# Patient Record
Sex: FEMALE | Race: BLACK OR AFRICAN AMERICAN | Employment: OTHER | URBAN - METROPOLITAN AREA
[De-identification: names, ages, dates, MRNs, and addresses within clinical notes are randomized per-mention and may not be internally consistent; named-entity substitution may affect disease eponyms.]

---

## 2017-01-01 RX ORDER — PRAVASTATIN SODIUM 20 MG/1
TABLET ORAL
Qty: 30 TAB | Refills: 11 | Status: SHIPPED | OUTPATIENT
Start: 2017-01-01

## 2017-01-18 ENCOUNTER — OFFICE VISIT (OUTPATIENT)
Dept: INTERNAL MEDICINE CLINIC | Age: 82
End: 2017-01-18

## 2017-01-18 ENCOUNTER — HOSPITAL ENCOUNTER (OUTPATIENT)
Dept: LAB | Age: 82
Discharge: HOME | End: 2017-01-18
Payer: MEDICARE

## 2017-01-18 VITALS
WEIGHT: 111 LBS | HEIGHT: 62 IN | TEMPERATURE: 98.1 F | OXYGEN SATURATION: 95 % | SYSTOLIC BLOOD PRESSURE: 136 MMHG | BODY MASS INDEX: 20.43 KG/M2 | HEART RATE: 72 BPM | DIASTOLIC BLOOD PRESSURE: 74 MMHG | RESPIRATION RATE: 16 BRPM

## 2017-01-18 DIAGNOSIS — E78.5 HYPERLIPIDEMIA, UNSPECIFIED HYPERLIPIDEMIA TYPE: ICD-10-CM

## 2017-01-18 DIAGNOSIS — F03.90 DEMENTIA WITHOUT BEHAVIORAL DISTURBANCE, UNSPECIFIED DEMENTIA TYPE: ICD-10-CM

## 2017-01-18 DIAGNOSIS — E11.9 CONTROLLED TYPE 2 DIABETES MELLITUS WITHOUT COMPLICATION, WITHOUT LONG-TERM CURRENT USE OF INSULIN (HCC): ICD-10-CM

## 2017-01-18 DIAGNOSIS — I10 ESSENTIAL HYPERTENSION: Primary | ICD-10-CM

## 2017-01-18 DIAGNOSIS — Z23 ENCOUNTER FOR IMMUNIZATION: ICD-10-CM

## 2017-01-18 DIAGNOSIS — B35.3 TINEA PEDIS OF BOTH FEET: ICD-10-CM

## 2017-01-18 PROCEDURE — 80053 COMPREHEN METABOLIC PANEL: CPT

## 2017-01-18 PROCEDURE — 36415 COLL VENOUS BLD VENIPUNCTURE: CPT

## 2017-01-18 PROCEDURE — 83036 HEMOGLOBIN GLYCOSYLATED A1C: CPT

## 2017-01-18 PROCEDURE — 80061 LIPID PANEL: CPT

## 2017-01-18 NOTE — MR AVS SNAPSHOT
Visit Information Date & Time Provider Department Dept. Phone Encounter #  
 1/18/2017  1:00 PM Lisa West MD [facility] [phone] [id number] Follow-up Instructions Return in about 6 months (around 7/18/2017) for bp, weight. Upcoming Health Maintenance Date Due  
 EYE EXAM RETINAL OR DILATED Q1 8/2/1932 DTaP/Tdap/Td series (1 - Tdap) 8/2/1943 GLAUCOMA SCREENING Q2Y 8/2/1987 Pneumococcal 65+ High/Highest Risk (2 of 2 - PPSV23) 5/4/2016 INFLUENZA AGE 9 TO ADULT 8/1/2016 HEMOGLOBIN A1C Q6M 9/9/2016 MICROALBUMIN Q1 3/9/2017 LIPID PANEL Q1 3/9/2017 MEDICARE YEARLY EXAM 3/10/2017 FOOT EXAM Q1 1/18/2018 Allergies as of 1/18/2017  Review Complete On: 1/18/2017 By: Lisa West MD  
 No Known Allergies Current Immunizations  Reviewed on 1/18/2017 Name Date Influenza High Dose Vaccine PF  Incomplete Influenza Vaccine Carmen Juwan) 9/9/2015 Influenza Vaccine Split 10/31/2012 10:17 AM  
 Pneumococcal Conjugate (PCV-13) 3/9/2016 Pneumococcal Vaccine (Unspecified Type)  Deferred (Patient Refused) Reviewed by Constanza Richmond LPN on 2/75/0012 at 94:01 PM  
 Reviewed by Lisa West MD on 1/18/2017 at  1:18 PM  
You Were Diagnosed With   
  
 Codes Comments Essential hypertension    -  Primary ICD-10-CM: I10 
ICD-9-CM: 401.9 Controlled type 2 diabetes mellitus without complication, without long-term current use of insulin ([facility]     ICD-10-CM: E11.9 ICD-9-CM: 250.00 Hyperlipidemia, unspecified hyperlipidemia type     ICD-10-CM: E78.5 ICD-9-CM: 272.4 Dementia without behavioral disturbance, unspecified dementia type     ICD-10-CM: F03.90 ICD-9-CM: 294.20 Encounter for immunization     ICD-10-CM: U27 ICD-9-CM: V03.89 Vitals BP Pulse Temp Resp Height(growth percentile) Weight(growth percentile)  136/74 (BP 1 Location: Right arm, BP Patient Position: Sitting) 72 98.1 °F (36.7 °C) (Oral) 16 5' 2\" (1.575 m) 111 lb (50.3 kg) SpO2 BMI OB Status Smoking Status 95% 20.3 kg/m2 Postmenopausal Never Smoker Vitals History BMI and BSA Data Body Mass Index Body Surface Area  
 20.3 kg/m 2 1.48 m 2 Preferred Pharmacy Pharmacy Name Phone [id number], [address] [phone] Your Updated Medication List  
  
   
This list is accurate as of: 1/18/17  1:32 PM.  Always use your most recent med list. amLODIPine 10 mg tablet Commonly known as:  Tad Zora Take 1 Tab by mouth daily. glucose blood VI test strips strip Commonly known as:  ONETOUCH ULTRA TEST Test blood sugar in morning and at bedtime  
  
 losartan 25 mg tablet Commonly known as:  COZAAR  
take 1 tablet by mouth once daily ONETOUCH ULTRASOFT LANCETS Misc Generic drug:  Lancets TEST every morning and at bedtime  
  
 pravastatin 20 mg tablet Commonly known as:  PRAVACHOL  
take 1 tablet by mouth once daily We Performed the Following ADMIN INFLUENZA VIRUS VAC [ [facility]] HEMOGLOBIN A1C WITH EAG [37812 CPT(R)]  DIABETES FOOT EXAM [7 Custom] INFLUENZA VIRUS VACCINE, HIGH DOSE SEASONAL, PRESERVATIVE FREE [01734 CPT(R)] LIPID PANEL [14834 CPT(R)] METABOLIC PANEL, COMPREHENSIVE [74789 CPT(R)] Follow-up Instructions Return in about 6 months (around 7/18/2017) for bp, weight. Please provide this summary of care documentation to your next provider. Your primary care clinician is listed as Andreina Figueroa. If you have any questions after today's visit, please call [phone].

## 2017-01-18 NOTE — PROGRESS NOTES
Patient received paperwork for advance directive during previous visit but has not completed at this time     Reviewed record In preparation for visit and have obtained necessary documentation      1. Have you been to the ER, urgent care clinic since your last visit? Hospitalized since your last visit? NO    2. Have you seen or consulted any other health care providers outside of the [address] [place] [address] since your last visit? Include any pap smears or colon screening.  NO

## 2017-01-18 NOTE — PROGRESS NOTES
HPI  Ms. Sarah Oropeza is a 80y.o. year old female, she is seen today for follow up HTN, weight. Complains of pain between toes of left foot. Eating well per patient (okay per daughter). No cp, sob, dizziness or weakness. No palpitations. No edema. No n/v/abd pain. [place] couple weeks ago in kitchen, walking and lost balance, no injury. Didn't have cane at the time but mostly uses it. Chief Complaint   Patient presents with    Blood Pressure Check     rm 1//NONfasting    Weight Management        Prior to Admission medications    Medication Sig Start Date End Date Taking? Authorizing Provider   nystatin (MYCOSTATIN) powder Apply  to affected area four (4) times daily. 1/20/17  Yes Trell Cueto MD   pravastatin (PRAVACHOL) 20 mg tablet take 1 tablet by mouth once daily 11/27/16  Yes Trell Cueto MD   losartan (COZAAR) 25 mg tablet take 1 tablet by mouth once daily 4/3/16  Yes Trell Cueto MD   amLODIPine (NORVASC) 10 mg tablet Take 1 Tab by mouth daily. 3/9/16  Yes Trell Cueto MD   glucose blood VI test strips (ONE TOUCH ULTRA TEST) strip Test blood sugar in morning and at bedtime 11/11/14   Trell Cueto MD   ONE TOUCH ULTRASOFT LANCETS misc TEST every morning and at bedtime 7/15/14   Trell Cueto MD         No Known Allergies      REVIEW OF SYSTEMS:  Per HPI    PHYSICAL EXAM:  Visit Vitals    /74 (BP 1 Location: Right arm, BP Patient Position: Sitting)    Pulse 72    Temp 98.1 °F (36.7 °C) (Oral)    Resp 16    Ht 5' 2\" (1.575 m)    Wt 111 lb (50.3 kg)    SpO2 95%    BMI 20.3 kg/m2     Constitutional: Appears well-developed and well-nourished. No distress. HENT:   Head: Normocephalic and atraumatic. Eyes: No scleral icterus. Neck: no lad, no tm, supple   Cardiovascular: Normal S1/S2, regular rhythm. 3/6 systolic murmur without rubs, or gallops. Pulmonary/Chest: Effort normal and breath sounds normal. No respiratory distress.  No wheezes, rhonchi, or rales. Abdomen: Soft, NT/ND, +BS, no rebound or guarding, no masses, no HSM appreciated. Ext: No edema. Neurological: Alert. Psychiatric: Normal mood and affect. Behavior is normal.    DM foot exam: trace pedal pulses, +macerated skin between all toes both feet, sensation intact to monofilament right foot, absent left foot , +onychomycosis of toenails      Lab Results   Component Value Date/Time    Sodium 141 01/18/2017 01:49 PM    Potassium 4.6 01/18/2017 01:49 PM    Chloride 101 01/18/2017 01:49 PM    CO2 24 01/18/2017 01:49 PM    Anion gap 9 09/10/2010 11:35 AM    Glucose 128 01/18/2017 01:49 PM    BUN 16 01/18/2017 01:49 PM    Creatinine 1.08 01/18/2017 01:49 PM    BUN/Creatinine ratio 15 01/18/2017 01:49 PM    GFR est AA 51 01/18/2017 01:49 PM    GFR est non-AA 44 01/18/2017 01:49 PM    Calcium 9.7 01/18/2017 01:49 PM    Bilirubin, total 0.8 01/18/2017 01:49 PM    ALT 10 01/18/2017 01:49 PM    AST 21 01/18/2017 01:49 PM    Alk. phosphatase 98 01/18/2017 01:49 PM    Protein, total 7.6 01/18/2017 01:49 PM    Albumin 4.2 01/18/2017 01:49 PM    Globulin 4.0 09/08/2010 11:13 AM    A-G Ratio 1.2 01/18/2017 01:49 PM     Lab Results   Component Value Date/Time    Hemoglobin A1c 5.8 01/18/2017 01:49 PM    Hemoglobin A1c 5.7 03/09/2016 03:38 PM    Hemoglobin A1c 6.3 09/02/2014 10:45 AM      Lab Results   Component Value Date/Time    Cholesterol, total 184 01/18/2017 01:49 PM    HDL Cholesterol 33 01/18/2017 01:49 PM    LDL, calculated 107 01/18/2017 01:49 PM    VLDL, calculated 44 01/18/2017 01:49 PM    Triglyceride 219 01/18/2017 01:49 PM    CHOL/HDL Ratio 7.0 06/24/2009 11:50 AM          ASSESSMENT/PLAN  Gladis Coffey was seen today for blood pressure check and weight management.     Diagnoses and all orders for this visit:    Essential hypertension  -     METABOLIC PANEL, COMPREHENSIVE  At goal, continue current medications   Controlled type 2 diabetes mellitus without complication, without long-term current use of insulin (HCC)  -      DIABETES FOOT EXAM  -     HEMOGLOBIN A1C WITH EAG  Diet controlled  Hyperlipidemia, unspecified hyperlipidemia type  -     LIPID PANEL    Dementia without behavioral disturbance, unspecified dementia type  stable  Encounter for immunization  -     Influenza virus vaccine ([address]) 72 years and older  -     Administration fee () for Medicare insured patients    Tinea pedis of both feet  -     nystatin (MYCOSTATIN) powder; Apply  to affected area four (4) times daily. advised appt with podiatry for nail trimming  Other orders  -     CVD REPORT  -     CKD REPORT          Health Maintenance Due   Topic Date Due    EYE EXAM RETINAL OR DILATED Q1  08/02/1932    GLAUCOMA SCREENING Q2Y  08/02/1987    Pneumococcal 65+ High/Highest Risk (2 of 2 - PPSV23) 05/04/2016        Follow-up Disposition:  Return in about 6 months (around 7/18/2017) for bp, weight. Reviewed plan of care. Patient has provided input and agrees with goals. The nurse provided the patient and/or family with advanced directive information if needed and encouraged the patient to provide a copy to the office when available.

## 2017-01-19 LAB
ALBUMIN SERPL-MCNC: 4.2 G/DL (ref 3.2–4.6)
ALBUMIN/GLOB SERPL: 1.2 {RATIO} (ref 1.1–2.5)
ALP SERPL-CCNC: 98 IU/L (ref 39–117)
ALT SERPL-CCNC: 10 IU/L (ref 0–32)
AST SERPL-CCNC: 21 IU/L (ref 0–40)
BILIRUB SERPL-MCNC: 0.8 MG/DL (ref 0–1.2)
BUN SERPL-MCNC: 16 MG/DL (ref 10–36)
BUN/CREAT SERPL: 15 (ref 11–26)
CALCIUM SERPL-MCNC: 9.7 MG/DL (ref 8.7–10.3)
CHLORIDE SERPL-SCNC: 101 MMOL/L (ref 96–106)
CHOLEST SERPL-MCNC: 184 MG/DL (ref 100–199)
CO2 SERPL-SCNC: 24 MMOL/L (ref 18–29)
CREAT SERPL-MCNC: 1.08 MG/DL (ref 0.57–1)
EST. AVERAGE GLUCOSE BLD GHB EST-MCNC: 120 MG/DL
GLOBULIN SER CALC-MCNC: 3.4 G/DL (ref 1.5–4.5)
GLUCOSE SERPL-MCNC: 128 MG/DL (ref 65–99)
HBA1C MFR BLD: 5.8 % (ref 4.8–5.6)
HDLC SERPL-MCNC: 33 MG/DL
INTERPRETATION, 910389: NORMAL
INTERPRETATION: NORMAL
LDLC SERPL CALC-MCNC: 107 MG/DL (ref 0–99)
POTASSIUM SERPL-SCNC: 4.6 MMOL/L (ref 3.5–5.2)
PROT SERPL-MCNC: 7.6 G/DL (ref 6–8.5)
SODIUM SERPL-SCNC: 141 MMOL/L (ref 134–144)
TRIGL SERPL-MCNC: 219 MG/DL (ref 0–149)
VLDLC SERPL CALC-MCNC: 44 MG/DL (ref 5–40)

## 2017-01-20 RX ORDER — NYSTATIN 100000 [USP'U]/G
POWDER TOPICAL 4 TIMES DAILY
Qty: 1 BOTTLE | Refills: 3 | Status: SHIPPED | OUTPATIENT
Start: 2017-01-20 | End: 2017-06-30

## 2017-03-12 DIAGNOSIS — I10 ESSENTIAL HYPERTENSION: ICD-10-CM

## 2017-03-12 RX ORDER — AMLODIPINE BESYLATE 10 MG/1
TABLET ORAL
Qty: 30 TAB | Refills: 11 | Status: SHIPPED | OUTPATIENT
Start: 2017-03-12 | End: 2018-01-01 | Stop reason: SDUPTHER

## 2017-03-21 RX ORDER — LOSARTAN POTASSIUM 25 MG/1
TABLET ORAL
Qty: 30 TAB | Refills: 11 | Status: SHIPPED | OUTPATIENT
Start: 2017-03-21 | End: 2017-06-30

## 2017-03-27 RX ORDER — LOSARTAN POTASSIUM 25 MG/1
TABLET ORAL
Qty: 30 TAB | Refills: 11 | Status: ON HOLD | OUTPATIENT
Start: 2017-03-27 | End: 2017-07-03

## 2017-06-30 ENCOUNTER — HOSPITAL ENCOUNTER (OUTPATIENT)
Age: 82
Setting detail: OBSERVATION
Discharge: HOME | End: 2017-07-03
Attending: EMERGENCY MEDICINE | Admitting: INTERNAL MEDICINE
Payer: MEDICARE

## 2017-06-30 ENCOUNTER — TELEPHONE (OUTPATIENT)
Dept: INTERNAL MEDICINE CLINIC | Age: 82
End: 2017-06-30

## 2017-06-30 DIAGNOSIS — K92.2 GASTROINTESTINAL HEMORRHAGE, UNSPECIFIED GASTROINTESTINAL HEMORRHAGE TYPE: Primary | ICD-10-CM

## 2017-06-30 PROBLEM — K62.5 RECTAL BLEEDING: Status: ACTIVE | Noted: 2017-06-30

## 2017-06-30 PROBLEM — K62.5 RECTAL BLEED: Status: ACTIVE | Noted: 2017-06-30

## 2017-06-30 LAB
ABO + RH BLD: NORMAL
ALBUMIN SERPL BCP-MCNC: 3.4 G/DL (ref 3.5–5)
ALBUMIN/GLOB SERPL: 0.7 {RATIO} (ref 1.1–2.2)
ALP SERPL-CCNC: 101 U/L (ref 45–117)
ALT SERPL-CCNC: 19 U/L (ref 12–78)
ANION GAP BLD CALC-SCNC: 6 MMOL/L (ref 5–15)
APPEARANCE UR: ABNORMAL
APTT PPP: 25.7 SEC (ref 22.1–32.5)
AST SERPL W P-5'-P-CCNC: 17 U/L (ref 15–37)
ATRIAL RATE: 48 BPM
BACTERIA URNS QL MICRO: ABNORMAL /HPF
BASOPHILS # BLD AUTO: 0 K/UL (ref 0–0.1)
BASOPHILS # BLD: 1 % (ref 0–1)
BILIRUB SERPL-MCNC: 0.8 MG/DL (ref 0.2–1)
BILIRUB UR QL: NEGATIVE
BLOOD GROUP ANTIBODIES SERPL: NORMAL
BUN SERPL-MCNC: 19 MG/DL (ref 6–20)
BUN/CREAT SERPL: 18 (ref 12–20)
CALCIUM SERPL-MCNC: 9 MG/DL (ref 8.5–10.1)
CALCULATED P AXIS, ECG09: 36 DEGREES
CALCULATED R AXIS, ECG10: 9 DEGREES
CALCULATED T AXIS, ECG11: -63 DEGREES
CHLORIDE SERPL-SCNC: 108 MMOL/L (ref 97–108)
CO2 SERPL-SCNC: 24 MMOL/L (ref 21–32)
COLOR UR: ABNORMAL
CREAT SERPL-MCNC: 1.04 MG/DL (ref 0.55–1.02)
DIAGNOSIS, 93000: NORMAL
EOSINOPHIL # BLD: 0.2 K/UL (ref 0–0.4)
EOSINOPHIL NFR BLD: 3 % (ref 0–7)
EPITH CASTS URNS QL MICRO: ABNORMAL /LPF
ERYTHROCYTE [DISTWIDTH] IN BLOOD BY AUTOMATED COUNT: 14.7 % (ref 11.5–14.5)
GLOBULIN SER CALC-MCNC: 4.7 G/DL (ref 2–4)
GLUCOSE SERPL-MCNC: 87 MG/DL (ref 65–100)
GLUCOSE UR STRIP.AUTO-MCNC: NEGATIVE MG/DL
HCT VFR BLD AUTO: 38.1 % (ref 35–47)
HGB BLD-MCNC: 11.9 G/DL (ref 11.5–16)
HGB UR QL STRIP: ABNORMAL
INR PPP: 1 (ref 0.9–1.1)
KETONES UR QL STRIP.AUTO: NEGATIVE MG/DL
LEUKOCYTE ESTERASE UR QL STRIP.AUTO: ABNORMAL
LYMPHOCYTES # BLD AUTO: 34 % (ref 12–49)
LYMPHOCYTES # BLD: 2.2 K/UL (ref 0.8–3.5)
MCH RBC QN AUTO: 27 PG (ref 26–34)
MCHC RBC AUTO-ENTMCNC: 31.2 G/DL (ref 30–36.5)
MCV RBC AUTO: 86.6 FL (ref 80–99)
MONOCYTES # BLD: 0.3 K/UL (ref 0–1)
MONOCYTES NFR BLD AUTO: 5 % (ref 5–13)
NEUTS SEG # BLD: 3.7 K/UL (ref 1.8–8)
NEUTS SEG NFR BLD AUTO: 57 % (ref 32–75)
NITRITE UR QL STRIP.AUTO: NEGATIVE
P-R INTERVAL, ECG05: 188 MS
PH UR STRIP: 6 [PH] (ref 5–8)
PLATELET # BLD AUTO: 215 K/UL (ref 150–400)
POTASSIUM SERPL-SCNC: 4.3 MMOL/L (ref 3.5–5.1)
PROT SERPL-MCNC: 8.1 G/DL (ref 6.4–8.2)
PROT UR STRIP-MCNC: NEGATIVE MG/DL
PROTHROMBIN TIME: 10.4 SEC (ref 9–11.1)
Q-T INTERVAL, ECG07: 456 MS
QRS DURATION, ECG06: 76 MS
QTC CALCULATION (BEZET), ECG08: 407 MS
RBC # BLD AUTO: 4.4 M/UL (ref 3.8–5.2)
RBC #/AREA URNS HPF: ABNORMAL /HPF (ref 0–5)
SODIUM SERPL-SCNC: 138 MMOL/L (ref 136–145)
SP GR UR REFRACTOMETRY: 1.01 (ref 1–1.03)
SPECIMEN EXP DATE BLD: NORMAL
THERAPEUTIC RANGE,PTTT: NORMAL SECS (ref 58–77)
TROPONIN I SERPL-MCNC: <0.04 NG/ML
UROBILINOGEN UR QL STRIP.AUTO: 0.2 EU/DL (ref 0.2–1)
VENTRICULAR RATE, ECG03: 48 BPM
WBC # BLD AUTO: 6.4 K/UL (ref 3.6–11)
WBC URNS QL MICRO: ABNORMAL /HPF (ref 0–4)

## 2017-06-30 PROCEDURE — 99285 EMERGENCY DEPT VISIT HI MDM: CPT

## 2017-06-30 PROCEDURE — 80053 COMPREHEN METABOLIC PANEL: CPT | Performed by: EMERGENCY MEDICINE

## 2017-06-30 PROCEDURE — 99218 HC RM OBSERVATION: CPT

## 2017-06-30 PROCEDURE — 36415 COLL VENOUS BLD VENIPUNCTURE: CPT | Performed by: EMERGENCY MEDICINE

## 2017-06-30 PROCEDURE — 74011000250 HC RX REV CODE- 250: Performed by: EMERGENCY MEDICINE

## 2017-06-30 PROCEDURE — 96374 THER/PROPH/DIAG INJ IV PUSH: CPT

## 2017-06-30 PROCEDURE — 81001 URINALYSIS AUTO W/SCOPE: CPT | Performed by: INTERNAL MEDICINE

## 2017-06-30 PROCEDURE — 74011250636 HC RX REV CODE- 250/636: Performed by: EMERGENCY MEDICINE

## 2017-06-30 PROCEDURE — 85730 THROMBOPLASTIN TIME PARTIAL: CPT | Performed by: EMERGENCY MEDICINE

## 2017-06-30 PROCEDURE — 74011250636 HC RX REV CODE- 250/636: Performed by: INTERNAL MEDICINE

## 2017-06-30 PROCEDURE — 96361 HYDRATE IV INFUSION ADD-ON: CPT

## 2017-06-30 PROCEDURE — 84484 ASSAY OF TROPONIN QUANT: CPT | Performed by: EMERGENCY MEDICINE

## 2017-06-30 PROCEDURE — 85610 PROTHROMBIN TIME: CPT | Performed by: EMERGENCY MEDICINE

## 2017-06-30 PROCEDURE — C9113 INJ PANTOPRAZOLE SODIUM, VIA: HCPCS | Performed by: EMERGENCY MEDICINE

## 2017-06-30 PROCEDURE — 86900 BLOOD TYPING SEROLOGIC ABO: CPT | Performed by: EMERGENCY MEDICINE

## 2017-06-30 PROCEDURE — 93005 ELECTROCARDIOGRAM TRACING: CPT

## 2017-06-30 PROCEDURE — 85025 COMPLETE CBC W/AUTO DIFF WBC: CPT | Performed by: EMERGENCY MEDICINE

## 2017-06-30 RX ORDER — SODIUM CHLORIDE 0.9 % (FLUSH) 0.9 %
5-10 SYRINGE (ML) INJECTION EVERY 8 HOURS
Status: DISCONTINUED | OUTPATIENT
Start: 2017-06-30 | End: 2017-07-03 | Stop reason: HOSPADM

## 2017-06-30 RX ORDER — LORAZEPAM 0.5 MG/1
0.5 TABLET ORAL
Status: DISCONTINUED | OUTPATIENT
Start: 2017-06-30 | End: 2017-07-03 | Stop reason: HOSPADM

## 2017-06-30 RX ORDER — SODIUM CHLORIDE 0.9 % (FLUSH) 0.9 %
5-10 SYRINGE (ML) INJECTION AS NEEDED
Status: DISCONTINUED | OUTPATIENT
Start: 2017-06-30 | End: 2017-07-03 | Stop reason: HOSPADM

## 2017-06-30 RX ORDER — LOSARTAN POTASSIUM 50 MG/1
50 TABLET ORAL DAILY
Status: DISCONTINUED | OUTPATIENT
Start: 2017-07-01 | End: 2017-07-03 | Stop reason: HOSPADM

## 2017-06-30 RX ORDER — AMLODIPINE BESYLATE 5 MG/1
10 TABLET ORAL DAILY
Status: DISCONTINUED | OUTPATIENT
Start: 2017-07-01 | End: 2017-07-03 | Stop reason: HOSPADM

## 2017-06-30 RX ORDER — PRAVASTATIN SODIUM 20 MG/1
20 TABLET ORAL DAILY
Status: DISCONTINUED | OUTPATIENT
Start: 2017-07-01 | End: 2017-07-03 | Stop reason: HOSPADM

## 2017-06-30 RX ORDER — SODIUM CHLORIDE 9 MG/ML
75 INJECTION, SOLUTION INTRAVENOUS CONTINUOUS
Status: DISCONTINUED | OUTPATIENT
Start: 2017-06-30 | End: 2017-07-02

## 2017-06-30 RX ADMIN — SODIUM CHLORIDE 100 ML/HR: 900 INJECTION, SOLUTION INTRAVENOUS at 19:22

## 2017-06-30 RX ADMIN — SODIUM CHLORIDE 40 MG: 9 INJECTION, SOLUTION INTRAMUSCULAR; INTRAVENOUS; SUBCUTANEOUS at 15:28

## 2017-06-30 RX ADMIN — Medication 10 ML: at 21:53

## 2017-06-30 RX ADMIN — Medication 10 ML: at 19:22

## 2017-06-30 RX ADMIN — SODIUM CHLORIDE 1000 ML: 900 INJECTION, SOLUTION INTRAVENOUS at 13:43

## 2017-06-30 NOTE — PROGRESS NOTES
[facility] EMR reviewed. History significant for HTN, colon CA, DM and Dementia. Patient presents to the ED w/ rectal bleeding. Met w/patient, daughter Jacques Espana, and great-granddaughter at bedside. Noted patient greeting and hugging medical team members. History provided by [facility]. Patient lives in her home and daughter became caregiver 9 years ago. Shawna Civil is daughter/POA - patient has a third daughter Jennie Velasquez. Home is one level. DME includes a cane. Daughter states patient had a fall 3 months ago. No previous HH/ SNF noted. Una assist w/ transport to MD office. Jacques Espana inquired about transportation assistance. CM contacted [facility] obtained numbers for [facility] [phone]/ [phone] and [facility] [phone]. [facility] - noted hourly rate of $48 and round trip $245. Daughter expressed cost too expensive. Verbalized calling Sandi BURDEN however informed did not service area. This writer provided [facility] card for local agency to inquire more about location near [facility]. No additional transitional care needs verbalized at this time. Care Management Interventions  PCP Verified by CM: Yes  Last Visit to PCP: 01/18/17  Mode of Transport at Discharge: Other (see comment) (vehicle)  Hospital Transport Time of Discharge: [address] (CM Consult):  Other ([facility])  MyChart Signup: No  Discharge Durable Medical Equipment: No  Physical Therapy Consult: No  Occupational Therapy Consult: No  Speech Therapy Consult: No  Current Support Network: Own Home, Lives with Caregiver  Confirm Follow Up Transport:  (TBD)  Plan discussed with Pt/Family/Caregiver: Yes  Discharge Location  Discharge Placement:  (TBD)

## 2017-06-30 NOTE — ED NOTES
Pt up to the [facility] to urinate and then settled into a wheelchair waiting on transport. Family at bedside.

## 2017-06-30 NOTE — ED NOTES
Assisted to bedside commode by family members to void a small amount of urine. Purposeful hourly rounding completed. Patient has no complaints at this time. Rates pain a 0/10. Patient in a position of comfort. Belongings secure in their room, side rails up x 2, bedside table within reach, and call bell within reach. Will continue to monitor and re-assess as appropriate.

## 2017-06-30 NOTE — PROGRESS NOTES
Admission Medication Reconciliation:    Information obtained from:  Patient's daughter, Rx Query    Comments/Recommendations: Updated PTA meds/reviewed patient's allergies. 1)  All current medications were listed in PTA list. Clarified timing of medications and last doses. 2)  Patient took amlodipine and losartan this morning. 3)  Modified pravastatin to evening dose. Significant PMH/Disease States:   Past Medical History:   Diagnosis Date    Arrhythmia     Arthritis     Calf pain     when walking    Cancer [facility])     [facility] 1/15/2010    Dementia 3/10/2010    DM (diabetes mellitus) ([facility] 1/15/2010    Headache     Hearing loss     HTN (hypertension) 1/15/2010    Hyperlipidemia 1/15/2010    Leg swelling     MRSA (methicillin resistant staph aureus) culture positive     4/2013    SOB (shortness of breath)        Chief Complaint for this Admission:    Chief Complaint   Patient presents with    Rectal Bleeding       Allergies:  Review of patient's allergies indicates no known allergies. Prior to Admission Medications:   Prior to Admission Medications   Prescriptions Last Dose Informant Patient Reported? Taking?    amLODIPine (NORVASC) 10 mg tablet 6/30/2017 at am  No Yes   Sig: take 1 tablet by mouth once daily   losartan (COZAAR) 25 mg tablet 6/30/2017 at am  No Yes   Sig: take 1 tablet by mouth once daily   pravastatin (PRAVACHOL) 20 mg tablet 6/29/2017 at pm  No Yes   Sig: take 1 tablet by mouth once daily   Patient taking differently: take 1 tablet by mouth once daily in the evening      Facility-Administered Medications: None

## 2017-06-30 NOTE — ED PROVIDER NOTES
HPI Comments: 80 y.o. female with past medical history significant for HTN, colon cancer, diabetes, hyperlipidemia, dementia, arthritis, arrhythmia, hearing loss, headache, leg swelling, SOB, and MRSA who presents from home with chief complaint of blood in stool. Patient reportedly had two bowel movements this morning that were \"black and tarry\" and mixed with bright red blood. Patient's daughter reports that patient had a similar episode of tarry stools without blood 1 month ago. She states that the symptoms relieved without any intervention. Patient's daughter recently became patient's caregiver and is unsure of past history of colon cancer. Patient is reportedly A&Ox1 at baseline. She is not on any blood thinners. Patient denies being in any pain. Full history, physical exam, and ROS unable to be obtained due to:  dementia. There are no other acute medical concerns at this time. Social hx: nonsmoker, no EtOH use, no drug use  PCP: Daniel Gutierrez MD    Note written by Martha Butler, as dictated by Princess Yang MD 12:35 PM      The history is provided by the patient and a caregiver. No  was used.         Past Medical History:   Diagnosis Date    Arrhythmia     Arthritis     Calf pain     when walking    Cancer [facility])     [facility] cancer ([facility] 1/15/2010    Dementia 3/10/2010    DM (diabetes mellitus) ([facility] 1/15/2010    Headache     Hearing loss     HTN (hypertension) 1/15/2010    Hyperlipidemia 1/15/2010    Leg swelling     MRSA (methicillin resistant staph aureus) culture positive     4/2013    SOB (shortness of breath)        Past Surgical History:   Procedure Laterality Date    ENDOSCOPY, COLON, DIAGNOSTIC      HX CHOLECYSTECTOMY      HX COLECTOMY  2000         Family History:   Problem Relation Age of Onset    Diabetes Mother     Hypertension Mother     Heart Disease Mother     Stroke Mother     Diabetes Sister     Heart Disease Sister     Other Sister      alzheimers    Hypertension Sister     Cancer Sister     Hypertension Sister     Hypertension Brother     Heart Disease Brother        Social History     Social History    Marital status:      Spouse name: N/A    Number of children: N/A    Years of education: N/A     Occupational History    Not on file. Social History Main Topics    Smoking status: Never Smoker    Smokeless tobacco: Never Used    Alcohol use No    Drug use: No    Sexual activity: Not Currently     Partners: Male     Birth control/ protection: None     Other Topics Concern    Not on file     Social History Narrative         ALLERGIES: Review of patient's allergies indicates no known allergies. Review of Systems   Unable to perform ROS: Dementia       Vitals:    06/30/17 1215   BP: 140/61   Pulse: (!) 53   Resp: 16   Temp: 98.1 °F (36.7 °C)   SpO2: 98%   Weight: 49.1 kg (108 lb 3.9 oz)   Height: 5' (1.524 m)            Physical Exam     Nursing note and vitals reviewed. Constitutional: ELDERLY. NAD  HENT:   Head: Normocephalic and atraumatic. Sclera anicteric  Nose: No rhinorrhea  Mouth/Throat: Oropharynx is clear and moist. Pharynx normal  Eyes: Conjunctivae are normal. Pupils are equal, round, and reactive to light. Right eye exhibits no discharge. Left eye exhibits no discharge. No scleral icterus. Neck: Painless normal range of motion. Supple  Cardiovascular: Normal rate, regular rhythm, normal heart sounds and intact distal pulses. Exam reveals no gallop and no friction rub. 2/6 FOX  Pulmonary/Chest: Effort normal and breath sounds normal. No respiratory distress. no wheezes. no rales. Abdominal: Soft. Bowel sounds are normal. Exhibits no distension and no mass. No tenderness. No guarding. Musculoskeletal: Normal range of motion. no tenderness. No edema  Lymphadenopathy:   No cervical adenopathy. Neurological:  Alert and oriented to person ONLY. CN 2-12 intact. Moving all extremities.     Skin: Skin is warm and dry. No rash noted. No pallor. MDM  ED Course   80year-old female here with tarry and bloody appearing stools x2 this morning. Reportedly had tarry stools about one month ago. She is currently not on any anticoagulants. Not hypotensive or tachycardic. Abdomen soft, nontender and she denies any abdominal pain. Past history rather unknown regarding GI as the patient has dementia and the daughter does not recall her past GI history. Differential diagnosis includes diverticulosis, tumor, colitis and others. We'll check labs and type and screen. Likely needs admission. Procedures  ED EKG interpretation:  Rhythm: sinus bradycardia; and regular . Rate (approx.): 48; Axis: normal; ST/T wave: non-specific changes. Note written by Martha Dickerson, as dictated by Davi Barragan MD 1:15 PM    2:40 PM  Patient is being admitted to the hospital.  The results of their tests and reasons for their admission have been discussed with them and/or available family. They convey agreement and understanding for the need to be admitted and for their admission diagnosis. Consultation will be made now with the inpatient physician for hospitalization. PROGRESS NOTE:  2:43 PM Patient had a bowel movement, nurse noticed gross blood in stool. 3:55 PM  D/w Dr. Yesy Hinojosa, hospitalist who will admit. D/w Dr. Ayde Wallace, GI and they will consult. Davi Barragan MD     Recent Results (from the past 24 hour(s))   EKG, 12 LEAD, INITIAL    Collection Time: 06/30/17  1:00 PM   Result Value Ref Range    Ventricular Rate 48 BPM    Atrial Rate 48 BPM    P-R Interval 188 ms    QRS Duration 76 ms    Q-T Interval 456 ms    QTC Calculation (Bezet) 407 ms    Calculated P Axis 36 degrees    Calculated R Axis 9 degrees    Calculated T Axis -63 degrees    Diagnosis       Sinus bradycardia  Nonspecific T wave abnormality  When compared with ECG of 17-FEB-2010 14:06,  Vent.  rate has decreased BY  49 BPM  T wave inversion no longer evident in Anterior leads  QT has shortened  Confirmed by Mega Chin MD, Josee Michelle ([id number]) on 6/30/2017 1:11:67 PM     METABOLIC PANEL, COMPREHENSIVE    Collection Time: 06/30/17  1:28 PM   Result Value Ref Range    Sodium 138 136 - 145 mmol/L    Potassium 4.3 3.5 - 5.1 mmol/L    Chloride 108 97 - 108 mmol/L    CO2 24 21 - 32 mmol/L    Anion gap 6 5 - 15 mmol/L    Glucose 87 65 - 100 mg/dL    BUN 19 6 - 20 MG/DL    Creatinine 1.04 (H) 0.55 - 1.02 MG/DL    BUN/Creatinine ratio 18 12 - 20      GFR est AA 60 (L) >60 ml/min/1.73m2    GFR est non-AA 49 (L) >60 ml/min/1.73m2    Calcium 9.0 8.5 - 10.1 MG/DL    Bilirubin, total 0.8 0.2 - 1.0 MG/DL    ALT (SGPT) 19 12 - 78 U/L    AST (SGOT) 17 15 - 37 U/L    Alk. phosphatase 101 45 - 117 U/L    Protein, total 8.1 6.4 - 8.2 g/dL    Albumin 3.4 (L) 3.5 - 5.0 g/dL    Globulin 4.7 (H) 2.0 - 4.0 g/dL    A-G Ratio 0.7 (L) 1.1 - 2.2     CBC WITH AUTOMATED DIFF    Collection Time: 06/30/17  1:28 PM   Result Value Ref Range    WBC 6.4 3.6 - 11.0 K/uL    RBC 4.40 3.80 - 5.20 M/uL    HGB 11.9 11.5 - 16.0 g/dL    HCT 38.1 35.0 - 47.0 %    MCV 86.6 80.0 - 99.0 FL    MCH 27.0 26.0 - 34.0 PG    MCHC 31.2 30.0 - 36.5 g/dL    RDW 14.7 (H) 11.5 - 14.5 %    PLATELET 070 261 - 693 K/uL    NEUTROPHILS 57 32 - 75 %    LYMPHOCYTES 34 12 - 49 %    MONOCYTES 5 5 - 13 %    EOSINOPHILS 3 0 - 7 %    BASOPHILS 1 0 - 1 %    ABS. NEUTROPHILS 3.7 1.8 - 8.0 K/UL    ABS. LYMPHOCYTES 2.2 0.8 - 3.5 K/UL    ABS. MONOCYTES 0.3 0.0 - 1.0 K/UL    ABS. EOSINOPHILS 0.2 0.0 - 0.4 K/UL    ABS.  BASOPHILS 0.0 0.0 - 0.1 K/UL   PROTHROMBIN TIME + INR    Collection Time: 06/30/17  1:28 PM   Result Value Ref Range    INR 1.0 0.9 - 1.1      Prothrombin time 10.4 9.0 - 11.1 sec   PTT    Collection Time: 06/30/17  1:28 PM   Result Value Ref Range    aPTT 25.7 22.1 - 32.5 sec    aPTT, therapeutic range     58.0 - 77.0 SECS   TYPE & SCREEN    Collection Time: 06/30/17  1:28 PM   Result Value Ref Range Crossmatch Expiration 07/03/2017     ABO/Rh(D) A POSITIVE     Antibody screen NEG    TROPONIN I    Collection Time: 06/30/17  1:28 PM   Result Value Ref Range    Troponin-I, Qt. <0.04 <0.05 ng/mL       No results found.

## 2017-06-30 NOTE — PROGRESS NOTES
TRANSFER - IN REPORT:    Verbal report received from Chairsse (name) on Amelia Clintoner  being received from ED (unit) for routine progression of care      Report consisted of patients Situation, Background, Assessment and   Recommendations(SBAR). Information from the following report(s) SBAR was reviewed with the receiving nurse. Opportunity for questions and clarification was provided. [address] to Dr. Ede Tracy about pt's orders. Received orders to discontinue restraints, start mechanical soft diet and NS @100 mL/hr.

## 2017-06-30 NOTE — IP AVS SNAPSHOT
[address] [facility] [address] [place] [address] 
[phone] Patient: Jeanna Restrepo MRN: [id number] LJ:[id number] You are allergic to the following No active allergies Recent Documentation Height Weight BMI OB Status Smoking Status 1.524 m 49.1 kg 21.14 kg/m2 Postmenopausal Never Smoker Emergency Contacts  (Rel.) Home Phone Work Phone Mobile Phone Matthew Liz (Child) [phone] -- --  
 Tad Gaw  -- [phone] Mikhail Davis (Child) [phone] -- -- About your hospitalization You were admitted on:  June 30, 2017 You last received care in the:  [facility] You were discharged on:  July 3, 2017 Why you were hospitalized Your primary diagnosis was:  Rectal Bleeding Your diagnoses also included:  Rectal Bleed Providers Seen During Your Hospitalizations Provider Role Specialty Primary office phone Lucas Carrasquillo MD Attending Provider Emergency Medicine [phone] Candy Ospina MD Attending Provider Internal Medicine [phone] Jesús Willoughby MD Attending Provider Hospitalist [phone] Pauline Osuna MD Attending Provider Internal Medicine [phone] Your Primary Care Physician (PCP) Primary Care Physician Office Phone Office Fax [place], 1111 [facility] [phone] Follow-up Information Follow up With Details Comments Contact Info Diane Singleton MD In 1 week [address] [place] [address] Dr 1001 [facility] [address] [phone] Current Discharge Medication List  
  
CONTINUE these medications which have CHANGED Dose & Instructions Dispensing Information Comments Morning Noon Evening Bedtime  
 losartan 25 mg tablet Commonly known as:  COZAAR What changed:  See the new instructions. Your last dose was: Your next dose is: Dose:  50 mg Take 2 Tabs by mouth daily. Quantity:  30 Tab Refills:  11  
     
   
   
   
  
 pravastatin 20 mg tablet Commonly known as:  PRAVACHOL What changed:  See the new instructions. Your last dose was: Your next dose is:    
   
   
 take 1 tablet by mouth once daily Quantity:  30 Tab Refills:  11 CONTINUE these medications which have NOT CHANGED Dose & Instructions Dispensing Information Comments Morning Noon Evening Bedtime  
 amLODIPine 10 mg tablet Commonly known as:  Edinson Matar Your last dose was: Your next dose is:    
   
   
 take 1 tablet by mouth once daily Quantity:  30 Tab Refills:  11 Where to Get Your Medications Information on where to get these meds will be given to you by the nurse or doctor. ! Ask your nurse or doctor about these medications  
  losartan 25 mg tablet Discharge Instructions Discharge Instructions PATIENT ID: Kendra Hayes MRN: [id number] YOB: 1922 DATE OF ADMISSION: 6/30/2017 11:59 AM   
DATE OF DISCHARGE: 7/3/2017 PRIMARY CARE PROVIDER: Tino Cortes MD  
 
ATTENDING PHYSICIAN: Ofe Lang MD 
DISCHARGING PROVIDER: Ofe Lang MD   
To contact this individual call [phone] and ask the  to page. If unavailable ask to be transferred the Adult Hospitalist Department. DISCHARGE DIAGNOSES  
GI bleed CONSULTATIONS: IP CONSULT TO GASTROENTEROLOGY PROCEDURES/SURGERIES: * No surgery found * PENDING TEST RESULTS:  
At the time of discharge the following test results are still pending: none FOLLOW UP APPOINTMENTS:  
Follow-up Information Follow up With Details Comments Contact Info Tino Cortes MD In 1 week [address] [place] [address]  [address] [address] [phone] ADDITIONAL CARE RECOMMENDATIONS:  
CBC in 1 week Follow up with Dr Shiela Ivey DIET: Regular Diet ACTIVITY: Activity as tolerated DISCHARGE MEDICATIONS: 
 See Medication Reconciliation Form · It is important that you take the medication exactly as they are prescribed. · Keep your medication in the bottles provided by the pharmacist and keep a list of the medication names, dosages, and times to be taken in your wallet. · Do not take other medications without consulting your doctor. NOTIFY YOUR PHYSICIAN FOR ANY OF THE FOLLOWING:  
Fever over 101 degrees for 24 hours. Chest pain, shortness of breath, fever, chills, nausea, vomiting, diarrhea, change in mentation, falling, weakness, bleeding. Severe pain or pain not relieved by medications. Or, any other signs or symptoms that you may have questions about. DISPOSITION: 
x  Home With: 
 OT  PT  [place]  RN  
  
 SNF/Inpatient Rehab/LTAC Independent/assisted living Hospice Other: CDMP Checked:  
Yes x PROBLEM LIST Updated: 
Yes x Signed:  
Sada Motley MD 
7/3/2017 
1:54 PM 
 
Discharge Orders None ACO Transitions of Care Introducing [employer] [address] offers a voluntary care coordination program to provide high quality service and care to [facility] fee-for-service beneficiaries. [place] [place] was designed to help you enhance your health and well-being through the following services: ? Transitions of Care  support for individuals who are transitioning from one care setting to another (example: Hospital to home). ? Chronic and Complex Care Coordination  support for individuals and caregivers of those with serious or chronic illnesses or with more than one chronic (ongoing) condition and those who take a number of different medications. If you meet specific medical criteria, a [address] [place] [address] Rd may call you directly to coordinate your care with your primary care physician and your other care providers. For questions about the [facility] programs, please, contact your physicians office. For general questions or additional information about Accountable Care Organizations: 
Please visit www.medicare.gov/acos. html or call 1-800-MEDICARE (8-823.649.8554) TTY users should call 7-594.635.5994. [employer] Announcement We are excited to announce that we are making your provider's discharge notes available to you in [employer]. You will see these notes when they are completed and signed by the physician that discharged you from your recent hospital stay. If you have any questions or concerns about any information you see in [employer], please call the Health Information Department where you were seen or reach out to your Primary Care Provider for more information about your plan of care. Introducing [facility]! Dear Brian Mccray: Thank you for requesting a [employer] account. Our records indicate that you have previously registered for a [employer] account but its currently inactive. Please call our [employer] support line at [phone]. Additional Information If you have questions, please visit the Frequently Asked Questions section of the [employer] website at [email or web address] Remember, [employer] is NOT to be used for urgent needs. For medical emergencies, dial 911. Now available from your iPhone and Android! General Information Please provide this summary of care documentation to your next provider. Patient Signature:  ____________________________________________________________ Date:  ____________________________________________________________  
  
Mago Milian Provider Signature:  ____________________________________________________________ Date:  ____________________________________________________________

## 2017-06-30 NOTE — CONSULTS
[address] NOTE  Torres Wen, [facility]  [phone] office  [phone] NP in-hospital cell phone M-F until 4:30  After 5pm or on weekends, please call  for physician on call        NAME:  Georgie Wen   :   1922   MRN:   [id number]       Referring Physician: Ankit Miranda    Consult Date: 2017 3:50 PM    Chief Complaint: rectal bleeding     History of Present Illness:  Patient is a 80 y. o. who is seen in consultation at the request of Dr. Shahla Lopez for GI bleed. Pt PMH as below. Pt presented to ER for black tarry stools x 2 with bright red blood. Pt has had another grossly bloody stool since in ER. Some question about whether pt has had colon cancer for sure in past. Pt with dementia. Answers \"no\" to all questions. Family is not at bedside. I have reviewed the emergency room note and notes by all other clinicians who have seen the patient during this hospitalization to date. I have reviewed the problem list and the reason for this hospitalization. I have reviewed the allergies and the medications the patient was taking at home prior to this hospitalization. PMH:  Past Medical History:   Diagnosis Date    Arrhythmia     Arthritis     Calf pain     when walking    Cancer [facility])     Colon cancer ([facility] [address] [facility] 1/15/2010    Dementia 3/10/2010    DM (diabetes mellitus) ([facility] [address] 1/15/2010    Headache     Hearing loss     HTN (hypertension) 1/15/2010    Hyperlipidemia 1/15/2010    Leg swelling     MRSA (methicillin resistant staph aureus) culture positive     2013    SOB (shortness of breath)        PSH:  Past Surgical History:   Procedure Laterality Date    ENDOSCOPY, COLON, DIAGNOSTIC      HX CHOLECYSTECTOMY      HX COLECTOMY         Allergies:  No Known Allergies    Home Medications:  Prior to Admission Medications   Prescriptions Last Dose Informant Patient Reported? Taking? amLODIPine (NORVASC) 10 mg tablet 6/30/2017 at am  No Yes   Sig: take 1 tablet by mouth once daily   losartan (COZAAR) 25 mg tablet 6/30/2017 at am  No Yes   Sig: take 1 tablet by mouth once daily   pravastatin (PRAVACHOL) 20 mg tablet 6/29/2017 at pm  No Yes   Sig: take 1 tablet by mouth once daily   Patient taking differently: take 1 tablet by mouth once daily in the evening      Facility-Administered Medications: None       Hospital Medications:  No current facility-administered medications for this encounter.       Current Outpatient Prescriptions   Medication Sig    losartan (COZAAR) 25 mg tablet take 1 tablet by mouth once daily    amLODIPine (NORVASC) 10 mg tablet take 1 tablet by mouth once daily    pravastatin (PRAVACHOL) 20 mg tablet take 1 tablet by mouth once daily (Patient taking differently: take 1 tablet by mouth once daily in the evening)       Social History:  Social History   Substance Use Topics    Smoking status: Never Smoker    Smokeless tobacco: Never Used    Alcohol use No       Family History:  Family History   Problem Relation Age of Onset    Diabetes Mother     Hypertension Mother     Heart Disease Mother     Stroke Mother     Diabetes Sister     Heart Disease Sister     Other Sister      alzheimers    Hypertension Sister     Cancer Sister     Hypertension Sister     Hypertension Brother     Heart Disease Brother        Review of Systems:  Unable to assess due to pt clinical status    Objective:   Patient Vitals for the past 8 hrs:   BP Temp Pulse Resp SpO2 Height Weight   06/30/17 1430 146/79 - 76 24 100 % - -   06/30/17 1415 142/63 - 72 22 97 % - -   06/30/17 1400 136/67 - 63 22 100 % - -   06/30/17 1330 174/89 - 63 18 96 % - -   06/30/17 1315 (!) 154/107 - (!) 59 19 98 % - -   06/30/17 1300 125/60 - (!) 46 20 98 % - -   06/30/17 1245 113/63 - (!) 49 22 96 % - -   06/30/17 1230 117/58 - (!) 51 24 97 % - -   06/30/17 1215 140/61 98.1 °F (36.7 °C) (!) 53 16 98 % 5' (1.524 m) 49.1 kg (108 lb 3.9 oz)             EXAM:     CONST:  Elderly female on stretcher in no acute distress   NEURO:  Oriented to self   HEENT: EOMI, no scleral icterus   LUNGS: clear to ausculation, (-) wheeze   CARD:  regular rate and rhythm, S1 S2, 3/6 midsytolic murmur   ABD:  soft, no tenderness, no rebound, bowel sounds (+) all 4 quadrants, no masses, non distended   EXT:  no edema, warm   PSYCH: full, not anxious     Data Review     Recent Labs      06/30/17   1328   WBC  6.4   HGB  11.9   HCT  38.1   PLT  215     Recent Labs      06/30/17   1328   NA  138   K  4.3   CL  108   CO2  24   BUN  19   CREA  1.04*   GLU  87   CA  9.0     Recent Labs      06/30/17   1328   SGOT  17   AP  101   TP  8.1   ALB  3.4*   GLOB  4.7*     Recent Labs      06/30/17   1328   INR  1.0   PTP  10.4   APTT  25.7          Assessment:   · Hematochezia: anemic with gross blood in stool. Patient Active Problem List   Diagnosis Code    HTN (hypertension) I10    Colon cancer ([facility] [address] C18.9    DM (diabetes mellitus) ([facility] [address] [facility] E11.9    Hyperlipidemia E78.5    Dementia F03.90    Sebaceous cyst of breast N60.89    MRSA (methicillin resistant staph aureus) culture positive Z22.322    Diabetes mellitus type 2, controlled ([facility] [address] E11.9    Advance care planning Z71.89    Rectal bleeding K62.5     Plan:   · Recommend conservative management at this time  · Trend H/H  · Transfuse as necessary  · If pt begins to bleed briskly, order CTA GI bleed protocol and immediately consult IR for intervention if the CTA is positive  · Thank you for allowing me to participate in care of Hali Elizondo     Signed By: Michael Prescott. Arnoldo Cooley NP     6/30/2017  3:50 PM         I have examined the patient. I have reviewed the chart and agree with the documentation recorded by the NP, including the assessment, treatment plan, and disposition.     Suspect diverticular bleed  Conservative management  Will follow    Amber Styles MD

## 2017-06-30 NOTE — TELEPHONE ENCOUNTER
Patients daughter called r/t abd pain and black runny stools.  Dr martines informed and daughter advised to take patient to the ED

## 2017-06-30 NOTE — H&P
History & Physical    Primary Care Provider: Andrew Olguin MD  Source of Information: Patient's family     History of Presenting Illness:     Hali Elizondo is a 80 y.o. female who presents with: h/o rectal bleed into depends this am discribed as \"a lot\" by daughter, pt is demented and not able to provide history. Pt 's daughter note no other issues and that pt takes her medicines well and will pull at lines as in iv's and is all ways confused, is up with or w/o walker , no reported fever, n/v, falls. No h/o prior rectal bleedings. No relievieng factors no exacerbating factors. Review of Systems:  ROS    Not obtainable 2n2 patient factors. Past, Family, and/or Social History [facility])    Past Medical History:   Diagnosis Date    Arrhythmia     Arthritis     Calf pain     when walking    Cancer [facility])     Colon cancer ([facility] [address] 1/15/2010    Dementia 3/10/2010    DM (diabetes mellitus) ([facility] [address] 1/15/2010    Headache     Hearing loss     HTN (hypertension) 1/15/2010    Hyperlipidemia 1/15/2010    Leg swelling     MRSA (methicillin resistant staph aureus) culture positive     4/2013    SOB (shortness of breath)       Past Surgical History:   Procedure Laterality Date    ENDOSCOPY, COLON, DIAGNOSTIC      HX CHOLECYSTECTOMY      HX COLECTOMY  2000     Prior to Admission medications    Medication Sig Start Date End Date Taking?  Authorizing Provider   losartan (COZAAR) 25 mg tablet take 1 tablet by mouth once daily 3/27/17  Yes Andrew Olguin MD   amLODIPine (NORVASC) 10 mg tablet take 1 tablet by mouth once daily 3/12/17  Yes Andrew Olguin MD   pravastatin (PRAVACHOL) 20 mg tablet take 1 tablet by mouth once daily  Patient taking differently: take 1 tablet by mouth once daily in the evening 11/27/16  Yes Andrew Olguin MD     No Known Allergies   Family History   Problem Relation Age of Onset    Diabetes Mother     Hypertension Mother     Heart Disease Mother     Stroke Mother     Diabetes Sister     Heart Disease Sister     Other Sister      alzheimers    Hypertension Sister     Cancer Sister     Hypertension Sister     Hypertension Brother     Heart Disease Brother         SOCIAL HISTORY:  Patient resides:  Independently     Assisted Living    SNF    With family care x      Smoking history:   None x   Former    Chronic      Alcohol history:   None x   Social    Chronic      Patient ambulates:  Independently x   With Cane    With Walker    With WC             Objective:       Physical Exam:           Visit Vitals    /79    Pulse 76    Temp 98.1 °F (36.7 °C)    Resp 24    Ht 5' (1.524 m)    Wt 49.1 kg (108 lb 3.9 oz)    SpO2 100%    BMI 21.14 kg/m2      O2 Device: Room air  PHYSICAL EXAM:  General appearance:   No acute distress  ,     Alert;         Marginal   conversant      Eyes: anicteric sclerae, moist conjunctivae;      lid-lag;    + PERRLA;    HENT: Atraumatic;   oropharynx clear with:  moist  mucous membranes      Neck: Trachea midline; FROM, supple, no thyromegaly or lymphadenopathy;    Lungs: CTA, with normal respiratory effort and no intercostal retractions    CV: RRR, no gallop     Abdomen: Soft, non-tender; no masses or HSM;    Extremities: No  peripheral edema or extremity lymphadenopathy;    Skin: Normal temperature ; turgor is  ; the texture  nl ; no rash,     Neuro: CN 2- 12 intact, no focal motor weakness ,  ,    Psych: confused   affect, alert              Data Review:     Recent Days:  Recent Labs      06/30/17   1328   WBC  6.4   HGB  11.9   HCT  38.1   PLT  215     Recent Labs      06/30/17   1328   NA  138   K  4.3   CL  108   CO2  24   GLU  87   BUN  19   CREA  1.04*   CA  9.0   ALB  3.4*   TBILI  0.8   SGOT  17   ALT  19   INR  1.0     No results for input(s): PH, PCO2, PO2, HCO3, FIO2 in the last 72 hours.     24 Hour Results:  Recent Results (from the past 24 hour(s))   EKG, 12 LEAD, INITIAL    Collection Time: 06/30/17  1:00 PM   Result Value Ref Range    Ventricular Rate 48 BPM    Atrial Rate 48 BPM    P-R Interval 188 ms    QRS Duration 76 ms    Q-T Interval 456 ms    QTC Calculation (Bezet) 407 ms    Calculated P Axis 36 degrees    Calculated R Axis 9 degrees    Calculated T Axis -63 degrees    Diagnosis       Sinus bradycardia  Nonspecific T wave abnormality  When compared with ECG of 17-FEB-2010 14:06,  Vent. rate has decreased BY  49 BPM  T wave inversion no longer evident in Anterior leads  QT has shortened  Confirmed by Nadeem Villa MD, Tomasa Sesay ([id number]) on 6/30/2017 0:65:05 PM     METABOLIC PANEL, COMPREHENSIVE    Collection Time: 06/30/17  1:28 PM   Result Value Ref Range    Sodium 138 136 - 145 mmol/L    Potassium 4.3 3.5 - 5.1 mmol/L    Chloride 108 97 - 108 mmol/L    CO2 24 21 - 32 mmol/L    Anion gap 6 5 - 15 mmol/L    Glucose 87 65 - 100 mg/dL    BUN 19 6 - 20 MG/DL    Creatinine 1.04 (H) 0.55 - 1.02 MG/DL    BUN/Creatinine ratio 18 12 - 20      GFR est AA 60 (L) >60 ml/min/1.73m2    GFR est non-AA 49 (L) >60 ml/min/1.73m2    Calcium 9.0 8.5 - 10.1 MG/DL    Bilirubin, total 0.8 0.2 - 1.0 MG/DL    ALT (SGPT) 19 12 - 78 U/L    AST (SGOT) 17 15 - 37 U/L    Alk. phosphatase 101 45 - 117 U/L    Protein, total 8.1 6.4 - 8.2 g/dL    Albumin 3.4 (L) 3.5 - 5.0 g/dL    Globulin 4.7 (H) 2.0 - 4.0 g/dL    A-G Ratio 0.7 (L) 1.1 - 2.2     CBC WITH AUTOMATED DIFF    Collection Time: 06/30/17  1:28 PM   Result Value Ref Range    WBC 6.4 3.6 - 11.0 K/uL    RBC 4.40 3.80 - 5.20 M/uL    HGB 11.9 11.5 - 16.0 g/dL    HCT 38.1 35.0 - 47.0 %    MCV 86.6 80.0 - 99.0 FL    MCH 27.0 26.0 - 34.0 PG    MCHC 31.2 30.0 - 36.5 g/dL    RDW 14.7 (H) 11.5 - 14.5 %    PLATELET 370 599 - 065 K/uL    NEUTROPHILS 57 32 - 75 %    LYMPHOCYTES 34 12 - 49 %    MONOCYTES 5 5 - 13 %    EOSINOPHILS 3 0 - 7 %    BASOPHILS 1 0 - 1 %    ABS. NEUTROPHILS 3.7 1.8 - 8.0 K/UL    ABS. LYMPHOCYTES 2.2 0.8 - 3.5 K/UL    ABS.  MONOCYTES 0.3 0.0 - 1.0 K/UL ABS. EOSINOPHILS 0.2 0.0 - 0.4 K/UL    ABS. BASOPHILS 0.0 0.0 - 0.1 K/UL   PROTHROMBIN TIME + INR    Collection Time: 06/30/17  1:28 PM   Result Value Ref Range    INR 1.0 0.9 - 1.1      Prothrombin time 10.4 9.0 - 11.1 sec   PTT    Collection Time: 06/30/17  1:28 PM   Result Value Ref Range    aPTT 25.7 22.1 - 32.5 sec    aPTT, therapeutic range     58.0 - 77.0 SECS   TYPE & SCREEN    Collection Time: 06/30/17  1:28 PM   Result Value Ref Range    Crossmatch Expiration 07/03/2017     ABO/Rh(D) A POSITIVE     Antibody screen NEG    TROPONIN I    Collection Time: 06/30/17  1:28 PM   Result Value Ref Range    Troponin-I, Qt. <0.04 <0.05 ng/mL         Imaging:   CXR reviewed by me personally = na;  ECG reviewed by me personally = na; Old records: Old records reviewed: = yes;    Assessment: and Plan       Pt with new problem to me  y   Pt and her problems are new to me  y   Life threatening issue is  n            Impression    · Rectal bleed, ,;     Stable hct/ghb  Lab Results   Component Value Date/Time    HGB 11.9 06/30/2017 01:28 PM     Lab Results   Component Value Date/Time    HCT 38.1 06/30/2017 01:28 PM             Other Issues:    · HTN< on meds to continue;  · Dyslipidemia, no Statin to continue;   · Dementia /confusion, ;  ·  ;     Code: dnr   PPX:  SCD  Ambulation status PTA:  w walker   Expected DC:  1 day  Come form:  Home   Will need PT:  eval   Will need OT: eval   Will need Speech:  n  Will need Case: hh with ?  PT /Ot               Signed By: Yeni Joyce MD     June 30, 2017

## 2017-06-30 NOTE — ROUTINE PROCESS
TRANSFER - OUT REPORT:    Verbal report given to Tegaan Cisse RN(name) on Armani Castro  being transferred to [facility](unit) for routine progression of care       Report consisted of patients Situation, Background, Assessment and   Recommendations(SBAR). Information from the following report(s) SBAR and K[employer]ex was reviewed with the receiving nurse. Lines:   Peripheral IV 06/30/17 Right; Inner Forearm (Active)   Site Assessment Clean, dry, & intact 6/30/2017  1:30 PM   Phlebitis Assessment 0 6/30/2017  1:30 PM   Infiltration Assessment 0 6/30/2017  1:30 PM   Dressing Status Clean, dry, & intact 6/30/2017  1:30 PM   Hub Color/Line Status Pink 6/30/2017  1:30 PM        Opportunity for questions and clarification was provided.       Patient transported with:   [employer]

## 2017-06-30 NOTE — PROGRESS NOTES
Primary Nurse Patricia Durant RN and Dipti Garcia RN performed a dual skin assessment on this patient No impairment noted  Bartolo score is 18

## 2017-06-30 NOTE — ED NOTES
Pt ambulated to the restroom to void. Pt did have a small BM with blood noted in the toilet. Dr Bryn Lowe made aware.

## 2017-07-01 LAB
ANION GAP BLD CALC-SCNC: 6 MMOL/L (ref 5–15)
BUN SERPL-MCNC: 16 MG/DL (ref 6–20)
BUN/CREAT SERPL: 17 (ref 12–20)
CALCIUM SERPL-MCNC: 9.5 MG/DL (ref 8.5–10.1)
CHLORIDE SERPL-SCNC: 111 MMOL/L (ref 97–108)
CO2 SERPL-SCNC: 24 MMOL/L (ref 21–32)
CREAT SERPL-MCNC: 0.96 MG/DL (ref 0.55–1.02)
ERYTHROCYTE [DISTWIDTH] IN BLOOD BY AUTOMATED COUNT: 15 % (ref 11.5–14.5)
GLUCOSE SERPL-MCNC: 77 MG/DL (ref 65–100)
HCT VFR BLD AUTO: 38.7 % (ref 35–47)
HEMOCCULT STL QL: POSITIVE
HGB BLD-MCNC: 12.2 G/DL (ref 11.5–16)
MCH RBC QN AUTO: 27.4 PG (ref 26–34)
MCHC RBC AUTO-ENTMCNC: 31.5 G/DL (ref 30–36.5)
MCV RBC AUTO: 86.8 FL (ref 80–99)
PLATELET # BLD AUTO: 215 K/UL (ref 150–400)
POTASSIUM SERPL-SCNC: 4.3 MMOL/L (ref 3.5–5.1)
RBC # BLD AUTO: 4.46 M/UL (ref 3.8–5.2)
SODIUM SERPL-SCNC: 141 MMOL/L (ref 136–145)
WBC # BLD AUTO: 6.8 K/UL (ref 3.6–11)

## 2017-07-01 PROCEDURE — 36415 COLL VENOUS BLD VENIPUNCTURE: CPT | Performed by: INTERNAL MEDICINE

## 2017-07-01 PROCEDURE — 99218 HC RM OBSERVATION: CPT

## 2017-07-01 PROCEDURE — 80048 BASIC METABOLIC PNL TOTAL CA: CPT | Performed by: INTERNAL MEDICINE

## 2017-07-01 PROCEDURE — 85027 COMPLETE CBC AUTOMATED: CPT | Performed by: INTERNAL MEDICINE

## 2017-07-01 PROCEDURE — 74011250636 HC RX REV CODE- 250/636: Performed by: NURSE PRACTITIONER

## 2017-07-01 PROCEDURE — 74011250636 HC RX REV CODE- 250/636: Performed by: INTERNAL MEDICINE

## 2017-07-01 PROCEDURE — 74011250637 HC RX REV CODE- 250/637: Performed by: INTERNAL MEDICINE

## 2017-07-01 PROCEDURE — 82272 OCCULT BLD FECES 1-3 TESTS: CPT | Performed by: EMERGENCY MEDICINE

## 2017-07-01 PROCEDURE — 74011250636 HC RX REV CODE- 250/636: Performed by: HOSPITALIST

## 2017-07-01 PROCEDURE — 96361 HYDRATE IV INFUSION ADD-ON: CPT

## 2017-07-01 RX ORDER — LORAZEPAM 2 MG/ML
0.5 INJECTION INTRAMUSCULAR ONCE
Status: DISCONTINUED | OUTPATIENT
Start: 2017-07-01 | End: 2017-07-01

## 2017-07-01 RX ORDER — HALOPERIDOL 5 MG/ML
2 INJECTION INTRAMUSCULAR
Status: DISCONTINUED | OUTPATIENT
Start: 2017-07-01 | End: 2017-07-03 | Stop reason: HOSPADM

## 2017-07-01 RX ORDER — HALOPERIDOL 5 MG/ML
2 INJECTION INTRAMUSCULAR ONCE
Status: COMPLETED | OUTPATIENT
Start: 2017-07-01 | End: 2017-07-01

## 2017-07-01 RX ADMIN — PRAVASTATIN SODIUM 20 MG: 20 TABLET ORAL at 09:31

## 2017-07-01 RX ADMIN — Medication 10 ML: at 06:00

## 2017-07-01 RX ADMIN — SODIUM CHLORIDE 75 ML/HR: 900 INJECTION, SOLUTION INTRAVENOUS at 19:12

## 2017-07-01 RX ADMIN — AMLODIPINE BESYLATE 10 MG: 5 TABLET ORAL at 09:31

## 2017-07-01 RX ADMIN — SODIUM CHLORIDE 100 ML/HR: 900 INJECTION, SOLUTION INTRAVENOUS at 08:38

## 2017-07-01 RX ADMIN — LORAZEPAM 0.5 MG: 0.5 TABLET ORAL at 23:01

## 2017-07-01 RX ADMIN — LORAZEPAM 0.5 MG: 0.5 TABLET ORAL at 15:27

## 2017-07-01 RX ADMIN — Medication 10 ML: at 15:27

## 2017-07-01 RX ADMIN — LOSARTAN POTASSIUM 50 MG: 50 TABLET ORAL at 09:31

## 2017-07-01 RX ADMIN — HALOPERIDOL LACTATE 2 MG: 5 INJECTION, SOLUTION INTRAMUSCULAR at 02:52

## 2017-07-01 NOTE — PROGRESS NOTES
Bedside and Verbal shift change report given to Zoila Gaspar [phone] (oncoming nurse) by Lizbeth Goldstein (offgoing nurse). Report included the following information SBAR, Kardex, MAR and Recent Results. [address] [facility] [address] Blue's office. Waiting for call back. Results of occult blood positive. 1425 - Dr Martir Edwards returned call. Notified of occult blood results.

## 2017-07-01 NOTE — PROGRESS NOTES
GI PROGRESS NOTE    NAME:             Ashia Tyler   :              1922   MRN:              [id number]   Admit Date:     2017  Todays Date:  2017      Subjective:          Abdominal pain:no  Nausea: no    Vomiting: no   No rectal bleeding no stool    Review of Systems - Respiratory ROS: no cough, shortness of breath, or wheezing  Cardiovascular ROS: no chest pain or dyspnea on exertion. Demented responses questionable  Medications-reviewed     Current Facility-Administered Medications   Medication Dose Route Frequency    haloperidol lactate (HALDOL) injection 2 mg  2 mg IntraMUSCular Q4H PRN    pravastatin (PRAVACHOL) tablet 20 mg  20 mg Oral DAILY    amLODIPine (NORVASC) tablet 10 mg  10 mg Oral DAILY    losartan (COZAAR) tablet 50 mg  50 mg Oral DAILY    sodium chloride (NS) flush 5-10 mL  5-10 mL IntraVENous Q8H    sodium chloride (NS) flush 5-10 mL  5-10 mL IntraVENous PRN    LORazepam (ATIVAN) tablet 0.5 mg  0.5 mg Oral BID PRN    0.9% sodium chloride infusion  100 mL/hr IntraVENous CONTINUOUS        Objective:   Patient Vitals for the past 8 hrs:   BP Temp Pulse Resp SpO2   17 0832 145/79 97.5 °F (36.4 °C) 62 16 97 %      07 -  1900  In: -   Out: 600 [Urine:600]  1901 -  0700  In: 888 [I.V.:888]  Out: 200 [Urine:200]    EXAM:    Visit Vitals    /79 (BP 1 Location: Left arm, BP Patient Position: At rest)    Pulse 62    Temp 97.5 °F (36.4 °C)    Resp 16    Ht 5' (1.524 m)    Wt 49.1 kg (108 lb 3.9 oz)    SpO2 97%    BMI 21.14 kg/m2     General appearance:oriented to person only, cooperative, no distress, appears stated age  Lungs: clear to auscultation bilaterally  Heart: regular rate and rhythm, S1, S2 normal, no murmur, click, rub or gallop  Abdomen: soft, non-tender.  Bowel sounds normal. No masses,  no organomegaly      Data Review     Recent Labs      17   0202  17   1328   WBC  6.8  6.4   HGB  12.2  11.9   HCT  38.7 38.1   PLT  215  215     Recent Labs      07/01/17   0202  06/30/17   1328   NA  141  138   K  4.3  4.3   CL  111*  108   CO2  24  24   BUN  16  19   CREA  0.96  1.04*   GLU  77  87   CA  9.5  9.0     Recent Labs      06/30/17   1328   SGOT  17   AP  101   TP  8.1   ALB  3.4*   GLOB  4.7*                              Assessment:   · Hematochezia: anemic with gross blood in stool. No bleeding over night. Hgb stable.          Principal Problem:    Rectal bleeding (6/30/2017)    Active Problems:    Rectal bleed (6/30/2017)        Plan:   · Recommend conservative management at this time  · Trend H/H  · Transfuse as necessary  · If pt begins to bleed briskly, order CTA GI bleed protocol and immediately consult IR for intervention if the CTA is positive         Luis Luna MD

## 2017-07-01 NOTE — PROGRESS NOTES
Bedside and Verbal shift change report given to Ravinder Castro RN (oncoming nurse) by Cornel Frank RN (offgoing nurse). Report included the following information SBAR, Kardex, Intake/Output, MAR and Recent Results.

## 2017-07-01 NOTE — PROGRESS NOTES
Hospitalist Progress Note      Hospital summary: 80 y.o lady with dementia, DM, HTN, hyperlipidemia, CRC, who presented with rectal bleeding. Assessment/Plan:  Rectal bleeding: (POA)  -hgb stable  -agree with conservative management, GI recs appreciated  -if hgb stable by tomorrow can likely be discharged with outpt f/u    HTN: stable on current regimen    Hyperlipidemia: continue pravastatin    DM II:   -monitor with POC checks    Dementia    Code status: DNR  DVT prophylaxis: SCDs  Disposition: back to prior living arrangement  ----------------------------------------------    CC: rectal bleeding    S: the patient has dementia and is a poor historian. She doesn't know why she's in the hospital. She denies any pain. Tolerating diet. No n/v/d reported     Review of Systems:  Pertinent items are noted in HPI.     O:  Visit Vitals    /79 (BP 1 Location: Left arm, BP Patient Position: At rest)    Pulse 62    Temp 97.5 °F (36.4 °C)    Resp 16    Ht 5' (1.524 m)    Wt 49.1 kg (108 lb 3.9 oz)    SpO2 97%    BMI 21.14 kg/m2       PHYSICAL EXAM:  Gen: NAD, non-toxic  HEENT: anicteric sclerae, normal conjunctiva, oropharynx clear, MM moist  Neck: supple  Heart: RRR, no MRG, no JVD, no peripheral edema  Lungs: CTA b/l, non-labored respirations  Abd: soft, NT, ND, BS+  Extr: warm  Skin: dry, no rash  Neuro: CN II-XII grossly intact, normal speech, moves all extremities  Psych: normal mood, appropriate affect, oriented to self only      Intake/Output Summary (Last 24 hours) at 07/01/17 1308  Last data filed at 07/01/17 0824   Gross per 24 hour   Intake              888 ml   Output              800 ml   Net               88 ml        Recent labs & imaging reviewed:  Recent Labs      07/01/17   0202  06/30/17   1328   WBC  6.8  6.4   HGB  12.2  11.9   HCT  38.7  38.1   PLT  215  215     Recent Labs      07/01/17   0202  06/30/17   1328   NA  141  138   K  4.3  4.3   CL  111*  108 CO2  24  24   BUN  16  19   CREA  0.96  1.04*   GLU  77  87   CA  9.5  9.0     Recent Labs      06/30/17   1328   SGOT  17   ALT  19   AP  101   TBILI  0.8   TP  8.1   ALB  3.4*   GLOB  4.7*     Recent Labs      06/30/17   1328   INR  1.0   PTP  10.4   APTT  25.7        Med list reviewed  Current Facility-Administered Medications   Medication Dose Route Frequency    haloperidol lactate (HALDOL) injection 2 mg  2 mg IntraMUSCular Q4H PRN    pravastatin (PRAVACHOL) tablet 20 mg  20 mg Oral DAILY    amLODIPine (NORVASC) tablet 10 mg  10 mg Oral DAILY    losartan (COZAAR) tablet 50 mg  50 mg Oral DAILY    sodium chloride (NS) flush 5-10 mL  5-10 mL IntraVENous Q8H    sodium chloride (NS) flush 5-10 mL  5-10 mL IntraVENous PRN    LORazepam (ATIVAN) tablet 0.5 mg  0.5 mg Oral BID PRN    0.9% sodium chloride infusion  100 mL/hr IntraVENous CONTINUOUS       Ana Mcknight MD  Internal Medicine  Date of Service: 7/1/2017

## 2017-07-02 LAB
BACTERIA SPEC CULT: NORMAL
BACTERIA SPEC CULT: NORMAL
HCT VFR BLD AUTO: 33.4 % (ref 35–47)
HGB BLD-MCNC: 10.3 G/DL (ref 11.5–16)
SERVICE CMNT-IMP: NORMAL

## 2017-07-02 PROCEDURE — 85018 HEMOGLOBIN: CPT | Performed by: HOSPITALIST

## 2017-07-02 PROCEDURE — 99218 HC RM OBSERVATION: CPT

## 2017-07-02 PROCEDURE — 74011250637 HC RX REV CODE- 250/637: Performed by: INTERNAL MEDICINE

## 2017-07-02 PROCEDURE — 36415 COLL VENOUS BLD VENIPUNCTURE: CPT | Performed by: HOSPITALIST

## 2017-07-02 RX ADMIN — PRAVASTATIN SODIUM 20 MG: 20 TABLET ORAL at 09:20

## 2017-07-02 RX ADMIN — Medication 10 ML: at 23:07

## 2017-07-02 RX ADMIN — AMLODIPINE BESYLATE 10 MG: 5 TABLET ORAL at 09:20

## 2017-07-02 RX ADMIN — LORAZEPAM 0.5 MG: 0.5 TABLET ORAL at 18:10

## 2017-07-02 RX ADMIN — Medication 10 ML: at 06:17

## 2017-07-02 RX ADMIN — Medication 10 ML: at 14:51

## 2017-07-02 RX ADMIN — LOSARTAN POTASSIUM 50 MG: 50 TABLET ORAL at 09:20

## 2017-07-02 NOTE — PROGRESS NOTES
Hospitalist Progress Note      Hospital summary: 80 y.o lady with dementia, DM, HTN, hyperlipidemia, CRC, who presented with rectal bleeding. Assessment/Plan:  Rectal bleeding: (POA)  -hgb slight drop but could be dilutional. No more BMs  -agree with conservative management, GI recs appreciated  -if hgb stable by tomorrow can likely be discharged with outpt f/u  -Appreciate discussion with Dr Damien Gowers on phone    HTN: stable on current regimen    Hyperlipidemia: continue pravastatin    DM II:   -monitor with POC checks    Dementia    Code status: DNR  DVT prophylaxis: SCDs  Disposition: back to prior living arrangement  ----------------------------------------------    CC: rectal bleeding    S: the patient has dementia and is a poor historian. She doesn't know why she's in the hospital. She denies any pain. Tolerating diet. No n/v/d reported     Review of Systems:  Pertinent items are noted in HPI.     O:  Visit Vitals    /63 (BP 1 Location: Left arm, BP Patient Position: At rest)    Pulse (!) 51    Temp 97.3 °F (36.3 °C)    Resp 16    Ht 5' (1.524 m)    Wt 49.1 kg (108 lb 3.9 oz)    SpO2 97%    BMI 21.14 kg/m2       PHYSICAL EXAM:  Gen: NAD, non-toxic  HEENT: anicteric sclerae, normal conjunctiva, oropharynx clear, MM moist  Neck: supple  Heart: RRR, no MRG, no JVD, no peripheral edema  Lungs: CTA b/l, non-labored respirations  Abd: soft, NT, ND, BS+  Extr: warm  Skin: dry, no rash  Neuro: CN II-XII grossly intact, normal speech, moves all extremities  Psych: normal mood, appropriate affect, oriented to self only      Intake/Output Summary (Last 24 hours) at 07/02/17 0750  Last data filed at 07/01/17 2233   Gross per 24 hour   Intake             1078 ml   Output             1050 ml   Net               28 ml        Recent labs & imaging reviewed:  Recent Labs      07/02/17   0049  07/01/17   0202  06/30/17   1328   WBC   --   6.8  6.4   HGB  10.3*  12.2  11.9   HCT 33.4*  38.7  38.1   PLT   --   215  215     Recent Labs      07/01/17   0202  06/30/17   1328   NA  141  138   K  4.3  4.3   CL  111*  108   CO2  24  24   BUN  16  19   CREA  0.96  1.04*   GLU  77  87   CA  9.5  9.0     Recent Labs      06/30/17   1328   SGOT  17   ALT  19   AP  101   TBILI  0.8   TP  8.1   ALB  3.4*   GLOB  4.7*     Recent Labs      06/30/17   1328   INR  1.0   PTP  10.4   APTT  25.7        Med list reviewed  Current Facility-Administered Medications   Medication Dose Route Frequency    haloperidol lactate (HALDOL) injection 2 mg  2 mg IntraMUSCular Q4H PRN    pravastatin (PRAVACHOL) tablet 20 mg  20 mg Oral DAILY    amLODIPine (NORVASC) tablet 10 mg  10 mg Oral DAILY    losartan (COZAAR) tablet 50 mg  50 mg Oral DAILY    sodium chloride (NS) flush 5-10 mL  5-10 mL IntraVENous Q8H    sodium chloride (NS) flush 5-10 mL  5-10 mL IntraVENous PRN    LORazepam (ATIVAN) tablet 0.5 mg  0.5 mg Oral BID PRN    0.9% sodium chloride infusion  75 mL/hr IntraVENous Amena Tang MD  Internal Medicine  Date of Service: 7/2/2017

## 2017-07-02 NOTE — PROGRESS NOTES
Bedside shift change report given to Christy Morley (oncoming nurse) by Mary Porter (offgoing nurse). Report included the following information SBAR and Kardex.

## 2017-07-02 NOTE — PROGRESS NOTES
GI PROGRESS NOTE    NAME:             Toro Mckeon   :              1922   MRN:              [id number]   Admit Date:     2017  Todays Date:  2017      Subjective:          Abdominal pain:no  Nausea: no                                        Vomiting: no                No rectal bleeding no stool     Review of Systems - Respiratory ROS: no cough, shortness of breath, or wheezing  Cardiovascular ROS: no chest pain or dyspnea on exertion. Demented responses questionable  Medications-reviewed     Current Facility-Administered Medications   Medication Dose Route Frequency    haloperidol lactate (HALDOL) injection 2 mg  2 mg IntraMUSCular Q4H PRN    pravastatin (PRAVACHOL) tablet 20 mg  20 mg Oral DAILY    amLODIPine (NORVASC) tablet 10 mg  10 mg Oral DAILY    losartan (COZAAR) tablet 50 mg  50 mg Oral DAILY    sodium chloride (NS) flush 5-10 mL  5-10 mL IntraVENous Q8H    sodium chloride (NS) flush 5-10 mL  5-10 mL IntraVENous PRN    LORazepam (ATIVAN) tablet 0.5 mg  0.5 mg Oral BID PRN    0.9% sodium chloride infusion  75 mL/hr IntraVENous CONTINUOUS        Objective:   Patient Vitals for the past 8 hrs:   BP Temp Pulse Resp SpO2   17 0918 136/80 97.9 °F (36.6 °C) 69 18 99 %      07 -  1900  In: 240 [P.O.:240]  Out: -    190 -  0700  In: 1966 [P.O.:120; I.V.:1846]  Out: 1250 [Urine:1250]    EXAM:    Visit Vitals    /80 (BP 1 Location: Right arm, BP Patient Position: At rest)    Pulse 69    Temp 97.9 °F (36.6 °C)    Resp 18    Ht 5' (1.524 m)    Wt 49.1 kg (108 lb 3.9 oz)    SpO2 99%    BMI 21.14 kg/m2     General appearance:oriented to person only, cooperative, no distress, appears stated age  Lungs: clear to auscultation bilaterally  Heart: regular rate and rhythm, S1, S2 normal, no murmur, click, rub or gallop  Abdomen: soft, non-tender.  Bowel sounds normal. No masses,  no organomegaly      Data Review     Recent Labs      17   0049 07/01/17   0202  06/30/17   1328   WBC   --   6.8  6.4   HGB  10.3*  12.2  11.9   HCT  33.4*  38.7  38.1   PLT   --   215  215     Recent Labs      07/01/17   0202  06/30/17   1328   NA  141  138   K  4.3  4.3   CL  111*  108   CO2  24  24   BUN  16  19   CREA  0.96  1.04*   GLU  77  87   CA  9.5  9.0     Recent Labs      06/30/17   1328   SGOT  17   AP  101   TP  8.1   ALB  3.4*   GLOB  4.7*                              Assessment:   · Hematochezia: anemic with gross blood in stool. No bleeding over night. Hgb down due to re-equilibration.          Principal Problem:    Rectal bleeding (6/30/2017)    Active Problems:    Rectal bleed (6/30/2017)        Plan:   · Recommend conservative management at this time  · Trend H/H  · Transfuse as necessary  · If pt begins to bleed briskly, order CTA GI bleed protocol and immediately consult IR for intervention if the CTA is positive  · Home tomorrow if Hgb stable         Rush Qiu MD

## 2017-07-03 VITALS
RESPIRATION RATE: 18 BRPM | WEIGHT: 108.25 LBS | DIASTOLIC BLOOD PRESSURE: 85 MMHG | HEIGHT: 60 IN | OXYGEN SATURATION: 97 % | SYSTOLIC BLOOD PRESSURE: 133 MMHG | BODY MASS INDEX: 21.25 KG/M2 | HEART RATE: 81 BPM | TEMPERATURE: 98 F

## 2017-07-03 LAB — HGB BLD-MCNC: 9.9 G/DL (ref 11.5–16)

## 2017-07-03 PROCEDURE — 99218 HC RM OBSERVATION: CPT

## 2017-07-03 PROCEDURE — 85018 HEMOGLOBIN: CPT | Performed by: HOSPITALIST

## 2017-07-03 PROCEDURE — 74011250637 HC RX REV CODE- 250/637: Performed by: INTERNAL MEDICINE

## 2017-07-03 PROCEDURE — 36415 COLL VENOUS BLD VENIPUNCTURE: CPT | Performed by: HOSPITALIST

## 2017-07-03 RX ORDER — LOSARTAN POTASSIUM 25 MG/1
50 TABLET ORAL DAILY
Qty: 30 TAB | Refills: 11 | Status: SHIPPED | OUTPATIENT
Start: 2017-07-03 | End: 2018-01-01

## 2017-07-03 RX ADMIN — LORAZEPAM 0.5 MG: 0.5 TABLET ORAL at 00:56

## 2017-07-03 RX ADMIN — AMLODIPINE BESYLATE 10 MG: 5 TABLET ORAL at 09:44

## 2017-07-03 RX ADMIN — Medication 10 ML: at 06:36

## 2017-07-03 NOTE — PROGRESS NOTES
Spiritual Care Partner Volunteer visited patient in [address] on 7/3/17. Documented by:  Janusz Mac M.Div.    Paging Service [phone])

## 2017-07-03 NOTE — PROGRESS NOTES
[address]  [address], [address]       GI PROGRESS NOTE  Amilcarchayito SwannRonn, [facility]  [phone] office  [phone] NP in-hospital cell phone M-F until 4:30  After 5pm or on weekends, please call  for physician on call      NAME: Swathi Renteria   :  1922   MRN:  [id number]       Subjective:     Last hgb 10.3 on 17. Pt denies abdominal pain. No bleeding since this AM per PCT in room. Objective:     VITALS:   Last 24hrs VS reviewed since prior progress note. Most recent are:  Visit Vitals    /77    Pulse (!) 57    Temp 97.6 °F (36.4 °C)    Resp 18    Ht 5' (1.524 m)    Wt 49.1 kg (108 lb 3.9 oz)    SpO2 95%    BMI 21.14 kg/m2       PHYSICAL EXAM:  General: Cooperative, demented    Neurologic:  Oriented to self  HEENT: EOMI. Lungs:  CTA bilaterally. No wheezing  Heart:  S1 S2, regular OJAOXS, 7/7 midsystolic murmur   Abdomen: Soft, non distended, non tender. +Bowel sounds  Extremities: No edema  Psych:   Not anxious or agitated    Lab Data Reviewed:     No results found for this or any previous visit (from the past 24 hour(s)). ________________________________________________________________________       Assessment:   · Hematochezia: likely diverticular     Patient Active Problem List   Diagnosis Code    HTN (hypertension) I10    Colon cancer ([facility] C18.9    DM (diabetes mellitus) ([facility] E11.9    Hyperlipidemia E78.5    Dementia F03.90    Sebaceous cyst of breast N60.89    MRSA (methicillin resistant staph aureus) culture positive Z22.322    Diabetes mellitus type 2, controlled ([facility] E11.9    Advance care planning Z71.89    Rectal bleeding K62.5    Rectal bleed K62.5     Plan:   · D/c home  · Consider checking hemoglobin since drop from last one. Signed By: Troy Sylvester.  Jaden Carver NP     7/3/2017  12:08 PM

## 2017-07-03 NOTE — DISCHARGE SUMMARY
Discharge Summary       PATIENT ID: Swathi Renteria  MRN: [id number]   YOB: 1922    DATE OF ADMISSION: 6/30/2017 11:59 AM    DATE OF DISCHARGE: 7/3/2017   PRIMARY CARE PROVIDER: Steve Veloz MD     ATTENDING PHYSICIAN: Dr Sada Motley  DISCHARGING PROVIDER: Sada Motley MD    To contact this individual call [phone] and ask the  to page. If unavailable ask to be transferred the Adult Hospitalist Department. CONSULTATIONS: IP CONSULT TO GASTROENTEROLOGY    PROCEDURES/SURGERIES: * No surgery found *    ADMITTING DIAGNOSES & HOSPITAL COURSE:   Rectal bleeding: (POA)  -hgb slight drop but could be dilutional. No more BMs  -agree with conservative management, GI recs appreciated  -Will discharge the patient today  -Appreciate discussion with Dr Aldair Lind on phone    HTN: stable on current regimen    Hyperlipidemia: continue pravastatin    DM II:   -monitor with POC checks    Dementia          DISCHARGE DIAGNOSES / PLAN:      1. Gi bleed       PENDING TEST RESULTS:   At the time of discharge the following test results are still pending: none    FOLLOW UP APPOINTMENTS:    Follow-up Information     Follow up With Details Comments Contact Lizet Ramos MD In 1 week [address] [place] [address]  [phone]             ADDITIONAL CARE RECOMMENDATIONS:   CBC in 1 week  Follow up with Dr Laura Berman: Regular Diet    ACTIVITY: Activity as tolerated      DISCHARGE MEDICATIONS:  Current Discharge Medication List      CONTINUE these medications which have CHANGED    Details   losartan (COZAAR) 25 mg tablet Take 2 Tabs by mouth daily.   Qty: 30 Tab, Refills: 11         CONTINUE these medications which have NOT CHANGED    Details   amLODIPine (NORVASC) 10 mg tablet take 1 tablet by mouth once daily  Qty: 30 Tab, Refills: 11    Associated Diagnoses: Essential hypertension      pravastatin (PRAVACHOL) 20 mg tablet take 1 tablet by mouth once daily  Qty: 30 Tab, Refills: 11               NOTIFY YOUR PHYSICIAN FOR ANY OF THE FOLLOWING:   Fever over 101 degrees for 24 hours. Chest pain, shortness of breath, fever, chills, nausea, vomiting, diarrhea, change in mentation, falling, weakness, bleeding. Severe pain or pain not relieved by medications. Or, any other signs or symptoms that you may have questions about. DISPOSITION:   x Home With:   OT  PT  HH  RN       Long term SNF/Inpatient Rehab    Independent/assisted living    Hospice    Other:       PATIENT CONDITION AT DISCHARGE:     Functional status    Poor     Deconditioned    x Independent      Cognition     Lucid     Forgetful    x Dementia      Catheters/lines (plus indication)    Garcia     PICC     PEG    x None      Code status     Full code    x DNR      PHYSICAL EXAMINATION AT DISCHARGE:  Please see progress note      CHRONIC MEDICAL DIAGNOSES:  Problem List as of 7/3/2017  Date Reviewed: 6/30/2017          Codes Class Noted - Resolved    * (Principal)Rectal bleeding ICD-10-CM: K62.5  ICD-9-CM: 569.3  6/30/2017 - Present        Rectal bleed ICD-10-CM: K62.5  ICD-9-CM: 569.3  6/30/2017 - Present        Advance care planning ICD-10-CM: Z71.89  ICD-9-CM: V65.49  3/20/2016 - Present    Overview Signed 3/20/2016 11:13 AM by Bela Nicole MD     discussed 3/9/16             Diabetes mellitus type 2, controlled ([facility] ICD-10-CM: E11.9  ICD-9-CM: 250.00  3/9/2016 - Present        MRSA (methicillin resistant staph aureus) culture positive ICD-10-CM: Z22.322  ICD-9-CM: V02.54  9/19/2013 - Present    Overview Signed 9/19/2013 10:24 AM by Arturo Hickman [address], NP     4/2013: + culture with abscess.                Sebaceous cyst of breast ICD-10-CM: N60.89  ICD-9-CM: 610.8  12/17/2011 - Present        Dementia ICD-10-CM: F03.90  ICD-9-CM: 294.20  3/10/2010 - Present        HTN (hypertension) ICD-10-CM: I10  ICD-9-CM: 401.9  1/15/2010 - Present        Colon cancer ([facility] ICD-10-CM: C18.9  ICD-9-CM: 153.9  1/15/2010 - Present Overview Addendum 1/15/2010  3:54 PM by Bj Espinal     S/P colectomy  Dr Matthew Mims             DM (diabetes mellitus) [facility]) ICD-10-CM: E11.9  ICD-9-CM: 250.00  1/15/2010 - Present    Overview Signed 1/15/2010  3:54 PM by Bj Espinal     Diet controlled               Hyperlipidemia ICD-10-CM: E78.5  ICD-9-CM: 272.4  1/15/2010 - Present        RESOLVED: Abscess ICD-10-CM: L02.91  ICD-9-CM: 682.9  9/19/2013 - 3/9/2016              Greater than 36 minutes were spent with the patient on counseling and coordination of care    Signed:   Emmanuel Soria MD  7/3/2017  1:55 PM

## 2017-07-03 NOTE — PROGRESS NOTES
Bedside and Verbal shift change report given to Dwayne Perez RN (oncoming nurse) by Carolyn Mckeon RN (offgoing nurse). Report included the following information SBAR, Kardex, Intake/Output, MAR and Recent Results.

## 2017-07-03 NOTE — DISCHARGE INSTRUCTIONS
Discharge Instructions       PATIENT ID: Elgin Hoff  MRN: [id number]   YOB: 1922    DATE OF ADMISSION: 6/30/2017 11:59 AM    DATE OF DISCHARGE: 7/3/2017    PRIMARY CARE PROVIDER: Van Carpenter MD     ATTENDING PHYSICIAN: Ebony Tam MD  DISCHARGING PROVIDER: Ebony Tam MD    To contact this individual call [phone] and ask the  to page. If unavailable ask to be transferred the Adult Hospitalist Department. DISCHARGE DIAGNOSES   GI bleed    CONSULTATIONS: IP CONSULT TO GASTROENTEROLOGY    PROCEDURES/SURGERIES: * No surgery found *    PENDING TEST RESULTS:   At the time of discharge the following test results are still pending: none    FOLLOW UP APPOINTMENTS:   Follow-up Information     Follow up With Details Comments Contact Info    Van Carpenter MD In 1 week [address] [id number] [phone]             ADDITIONAL CARE RECOMMENDATIONS:   CBC in 1 week  Follow up with Dr Garcia Sport: Regular Diet    ACTIVITY: Activity as tolerated        DISCHARGE MEDICATIONS:   See Medication Reconciliation Form    · It is important that you take the medication exactly as they are prescribed. · Keep your medication in the bottles provided by the pharmacist and keep a list of the medication names, dosages, and times to be taken in your wallet. · Do not take other medications without consulting your doctor. NOTIFY YOUR PHYSICIAN FOR ANY OF THE FOLLOWING:   Fever over 101 degrees for 24 hours. Chest pain, shortness of breath, fever, chills, nausea, vomiting, diarrhea, change in mentation, falling, weakness, bleeding. Severe pain or pain not relieved by medications. Or, any other signs or symptoms that you may have questions about.       DISPOSITION:  x  Home With:   OT  PT  HH  RN       SNF/Inpatient Rehab/LTAC    Independent/assisted living    Hospice    Other:     CDMP Checked:   Yes x     PROBLEM LIST Updated:  Yes x       Signed:   Contreras Benji Clemons MD  7/3/2017  1:54 PM

## 2017-07-03 NOTE — PROGRESS NOTES
Hospitalist Progress Note      Hospital summary: 80 y.o lady with dementia, DM, HTN, hyperlipidemia, CRC, who presented with rectal bleeding. Assessment/Plan:  Rectal bleeding: (POA)  -hgb slight drop but could be dilutional. No more BMs  -agree with conservative management, GI recs appreciated  -Will discharge the patient today  -Appreciate discussion with Dr Per Bobo on phone    HTN: stable on current regimen    Hyperlipidemia: continue pravastatin    DM II:   -monitor with POC checks    Dementia    Code status: DNR  DVT prophylaxis: SCDs  Disposition: back to prior living arrangement  ----------------------------------------------    CC: rectal bleeding    S: the patient has dementia and is a poor historian. She doesn't know why she's in the hospital. She denies any pain. Tolerating diet. No n/v/d reported     Review of Systems:  Pertinent items are noted in HPI.     O:  Visit Vitals    /81 (BP 1 Location: Left arm, BP Patient Position: At rest)    Pulse 79    Temp 97.6 °F (36.4 °C)    Resp 18    Ht 5' (1.524 m)    Wt 49.1 kg (108 lb 3.9 oz)    SpO2 97%    BMI 21.14 kg/m2       PHYSICAL EXAM:  Gen: NAD, non-toxic  HEENT: anicteric sclerae, normal conjunctiva, oropharynx clear, MM moist  Neck: supple  Heart: RRR, no MRG, no JVD, no peripheral edema  Lungs: CTA b/l, non-labored respirations  Abd: soft, NT, ND, BS+  Extr: warm  Skin: dry, no rash  Neuro: CN II-XII grossly intact, normal speech, moves all extremities  Psych: normal mood, appropriate affect, oriented to self only      Intake/Output Summary (Last 24 hours) at 07/03/17 0744  Last data filed at 07/02/17 1451   Gross per 24 hour   Intake             1408 ml   Output              150 ml   Net             1258 ml        Recent labs & imaging reviewed:  Recent Labs      07/02/17   0049  07/01/17   0202  06/30/17   1328   WBC   --   6.8  6.4   HGB  10.3*  12.2  11.9   HCT  33.4*  38.7  38.1   PLT   --   215  215 Recent Labs      07/01/17   0202  06/30/17   1328   NA  141  138   K  4.3  4.3   CL  111*  108   CO2  24  24   BUN  16  19   CREA  0.96  1.04*   GLU  77  87   CA  9.5  9.0     Recent Labs      06/30/17   1328   SGOT  17   ALT  19   AP  101   TBILI  0.8   TP  8.1   ALB  3.4*   GLOB  4.7*     Recent Labs      06/30/17   1328   INR  1.0   PTP  10.4   APTT  25.7        Med list reviewed  Current Facility-Administered Medications   Medication Dose Route Frequency    haloperidol lactate (HALDOL) injection 2 mg  2 mg IntraMUSCular Q4H PRN    pravastatin (PRAVACHOL) tablet 20 mg  20 mg Oral DAILY    amLODIPine (NORVASC) tablet 10 mg  10 mg Oral DAILY    losartan (COZAAR) tablet 50 mg  50 mg Oral DAILY    sodium chloride (NS) flush 5-10 mL  5-10 mL IntraVENous Q8H    sodium chloride (NS) flush 5-10 mL  5-10 mL IntraVENous PRN    LORazepam (ATIVAN) tablet 0.5 mg  0.5 mg Oral BID PRN       Derik Dwyer MD  Internal Medicine  Date of Service: 7/3/2017

## 2017-07-05 ENCOUNTER — PATIENT OUTREACH (OUTPATIENT)
Dept: INTERNAL MEDICINE CLINIC | Age: 82
End: 2017-07-05

## 2017-07-05 NOTE — PROGRESS NOTES
Hospital Discharge Follow-Up      Date/Time:  2017 11:01 AM    Patient listed on discharge [facility]) report on 7/3/17. RRAT score: High Risk            25       Total Score        3 Relationship with PCP    4 More than 1 Admission in calendar year    18 Charlson Comorbidity Score        Criteria that do not apply:    Patient Living Status    Patient Length of Stay > 5    Patient Insurance is Medicare, Medicaid or Self Pay        Brief hospitalization history:  Patient was admitted to [facility] on 17 and discharged on 7/3/17 for GI bleed. Patient came to ED with complaints of black tarry stools with bright red blood. GI was consulted. GI recommended treating with conservative management. Nurse Navigator(NN) contacted the patient by telephone to perform post hospital discharge assessment and patient gave permission to speak with her daughter, Simone Ramos. Verified  and address with Simone Ramos as identifiers. Provided introduction to self, and explanation of the Nurses Navigator role. Simone Ramos says that her mother is doing much better and has not had any more blood in her stool. Simone Ramos is her caregiver. Patient given an opportunity to ask questions and they have no further questions or concerns at this time. No other clinical/social/functional needs noted. The patient agrees to contact the PCP office for questions related to their healthcare. NN provided contact information to the patient for future reference. The patient expressed thanks, offered no additional questions and ended the call. Diet:   Patient reports: Regular Diet    Activity:    Patient reports: mostly moving around the house, uses a cane, lives with her daughter, and needs moderate assistance with ADL's. Medication:   Performed medication reconciliation with patient, and patient verbalizes understanding of administration of home medications. There were no barriers to obtaining medications identified at this time.     Support system:  patient and daughters    Discharge Instructions :  Reviewed discharge instructions with patient. Patient verbalizes understanding of discharge instructions and follow-up care. Red Flags:  Blood in stool, nausea, vomiting, diarrhea  Reviewed red flags with patient, and patient verbalizes understanding. Labs Reviewed:  Recent Labs      07/03/17   0957   HGB  9.9*         PCP/Specialist follow up: Patient scheduled to follow up with Maryam Verdin MD on 7/11/17. Goals      Attends follow-up appointments as directed.             Patient will attend appointment with  [facility] on 7/11/17

## 2017-07-11 ENCOUNTER — OFFICE VISIT (OUTPATIENT)
Dept: INTERNAL MEDICINE CLINIC | Age: 82
End: 2017-07-11

## 2017-07-11 ENCOUNTER — HOSPITAL ENCOUNTER (OUTPATIENT)
Dept: LAB | Age: 82
Discharge: HOME | End: 2017-07-11
Payer: MEDICARE

## 2017-07-11 VITALS
WEIGHT: 107.1 LBS | TEMPERATURE: 98 F | RESPIRATION RATE: 16 BRPM | HEART RATE: 50 BPM | DIASTOLIC BLOOD PRESSURE: 70 MMHG | HEIGHT: 60 IN | OXYGEN SATURATION: 97 % | SYSTOLIC BLOOD PRESSURE: 130 MMHG | BODY MASS INDEX: 21.03 KG/M2

## 2017-07-11 DIAGNOSIS — Z00.00 ROUTINE GENERAL MEDICAL EXAMINATION AT A HEALTH CARE FACILITY: Primary | ICD-10-CM

## 2017-07-11 DIAGNOSIS — I10 ESSENTIAL HYPERTENSION: ICD-10-CM

## 2017-07-11 DIAGNOSIS — F03.90 DEMENTIA WITHOUT BEHAVIORAL DISTURBANCE, UNSPECIFIED DEMENTIA TYPE: ICD-10-CM

## 2017-07-11 DIAGNOSIS — D64.9 ANEMIA, UNSPECIFIED TYPE: ICD-10-CM

## 2017-07-11 DIAGNOSIS — Z13.31 SCREENING FOR DEPRESSION: ICD-10-CM

## 2017-07-11 DIAGNOSIS — K62.5 RECTAL BLEED: ICD-10-CM

## 2017-07-11 DIAGNOSIS — Z13.39 SCREENING FOR ALCOHOLISM: ICD-10-CM

## 2017-07-11 DIAGNOSIS — E11.9 CONTROLLED TYPE 2 DIABETES MELLITUS WITHOUT COMPLICATION, WITHOUT LONG-TERM CURRENT USE OF INSULIN (HCC): ICD-10-CM

## 2017-07-11 PROCEDURE — 85025 COMPLETE CBC W/AUTO DIFF WBC: CPT

## 2017-07-11 PROCEDURE — 82728 ASSAY OF FERRITIN: CPT

## 2017-07-11 PROCEDURE — 83036 HEMOGLOBIN GLYCOSYLATED A1C: CPT

## 2017-07-11 PROCEDURE — 36415 COLL VENOUS BLD VENIPUNCTURE: CPT

## 2017-07-11 PROCEDURE — 83550 IRON BINDING TEST: CPT

## 2017-07-11 RX ORDER — OMEPRAZOLE 20 MG/1
20 CAPSULE, DELAYED RELEASE ORAL DAILY
Qty: 90 CAP | Refills: 0 | Status: SHIPPED | OUTPATIENT
Start: 2017-07-11 | End: 2017-10-18

## 2017-07-11 NOTE — PROGRESS NOTES
HPI  Ms. Clover Moore is a 80y.o. year old female, she is seen today for hospital follow up, accompanied by daughter who is caregiver. Here for TCM visit. Admitted 6/30-7/3 to [facility] for rectal bleeding. First contact made by Obdulia Turcios RN on 7/5/17. Was anemic with hematochezia on admission. Seen by GI and plan was conservative management, transfuse as necessary, if patient bleeds briskly was to get CTA GI bleed protocol. To follow up with Dr. Josh Trinidad as outpatient. Hemoglobin was 11.9 on admission down to 9.9 on discharge. No transfusion. Thought to be partially dilutional.   Weight is down 4# in 6 mos. Stool was heme positive but I found no documentation of rectal exam.   Says she feels \"real good\". No abdominal pain, no dysphagia. No constipation or diarrhea. No cp or sob, no f/c or cough. No pain. No HA, vision changes or HA. No more rectal bleeding. No black tarry stools since. Has been taking iron pills since discharge.       Chief Complaint   Patient presents with   [facility] Follow Up     Room 1// NON fasting // need to schedule AWV        Prior to Admission medications    Medication Sig Start Date End Date Taking? Authorizing Provider   FERROUS SULFATE (IRON PO) Take  by mouth. Yes Historical Provider   omeprazole (PRILOSEC) 20 mg capsule Take 1 Cap by mouth daily. 7/11/17  Yes Alfredo Mckeon MD   losartan (COZAAR) 25 mg tablet Take 2 Tabs by mouth daily.  7/3/17  Yes Noe Perla MD   amLODIPine (NORVASC) 10 mg tablet take 1 tablet by mouth once daily 3/12/17  Yes Alfredo Mckeon MD   pravastatin (PRAVACHOL) 20 mg tablet take 1 tablet by mouth once daily  Patient taking differently: take 1 tablet by mouth once daily in the evening 11/27/16  Yes Alfredo Mckeon MD         No Known Allergies      REVIEW OF SYSTEMS:  Per HPI    PHYSICAL EXAM:  Visit Vitals    /70    Pulse (!) 50    Temp 98 °F (36.7 °C) (Oral)    Resp 16    Ht 5' (1.524 m)    Wt 107 lb 1.6 oz (48.6 kg)    SpO2 97%    BMI 20.92 kg/m2     Constitutional: Appears well-developed and well-nourished. No distress. HENT:   Head: Normocephalic and atraumatic. Eyes: No scleral icterus. Neck: no lad, no tm, supple   Cardiovascular: Normal S1/S2, regular rhythm. 2/6 systolic murmur without rubs, or gallops. Pulmonary/Chest: Effort normal and breath sounds normal. No respiratory distress. No wheezes, rhonchi, or rales. Abdomen: Soft, NT/ND, +BS, no rebound or guarding, no masses, no HSM appreciated. Rectal: external skin tags with slight protrusion of rectal tissue and internal hemorrhoid  Ext: No edema. Neurological: Alert. Psychiatric: Normal mood and affect. Behavior is normal.     Lab Results   Component Value Date/Time    Sodium 141 07/01/2017 02:02 AM    Potassium 4.3 07/01/2017 02:02 AM    Chloride 111 07/01/2017 02:02 AM    CO2 24 07/01/2017 02:02 AM    Anion gap 6 07/01/2017 02:02 AM    Glucose 77 07/01/2017 02:02 AM    BUN 16 07/01/2017 02:02 AM    Creatinine 0.96 07/01/2017 02:02 AM    BUN/Creatinine ratio 17 07/01/2017 02:02 AM    GFR est AA >60 07/01/2017 02:02 AM    GFR est non-AA 54 07/01/2017 02:02 AM    Calcium 9.5 07/01/2017 02:02 AM    Bilirubin, total 0.8 06/30/2017 01:28 PM    AST (SGOT) 17 06/30/2017 01:28 PM    Alk.  phosphatase 101 06/30/2017 01:28 PM    Protein, total 8.1 06/30/2017 01:28 PM    Albumin 3.4 06/30/2017 01:28 PM    Globulin 4.7 06/30/2017 01:28 PM    A-G Ratio 0.7 06/30/2017 01:28 PM    ALT (SGPT) 19 06/30/2017 01:28 PM     Lab Results   Component Value Date/Time    Hemoglobin A1c 5.8 01/18/2017 01:49 PM    Hemoglobin A1c 5.7 03/09/2016 03:38 PM    Hemoglobin A1c 6.3 09/02/2014 10:45 AM      Lab Results   Component Value Date/Time    Cholesterol, total 184 01/18/2017 01:49 PM    HDL Cholesterol 33 01/18/2017 01:49 PM    LDL, calculated 107 01/18/2017 01:49 PM    VLDL, calculated 44 01/18/2017 01:49 PM    Triglyceride 219 01/18/2017 01:49 PM    CHOL/HDL Ratio 7.0 06/24/2009 11:50 AM          ASSESSMENT/PLAN  Brian Mccray was seen today for hospital follow up. Diagnoses and all orders for this visit:    Routine general medical examination at a health care facility    Rectal bleed  -     omeprazole (PRILOSEC) 20 mg capsule; Take 1 Cap by mouth daily. Not clear if upper source as well given h/o dark stools - take prilosec x one month as potential benefits outweigh potential risks - if no further evidence of bleeding will not pursue further given age and comorbidities  Anemia, unspecified type  -     CBC WITH AUTOMATED DIFF  -     FERRITIN  -     IRON PROFILE  Recheck labs - no evidence of further bleeding  Controlled type 2 diabetes mellitus without complication, without long-term current use of insulin (HCC)  -     HEMOGLOBIN A1C WITH EAG    Essential hypertension  Controlled on current regimen, continue   Dementia without behavioral disturbance, unspecified dementia type  Stable  Screening for alcoholism    Screening for depression  -     Depression Screen Annual          Health Maintenance Due   Topic Date Due    MICROALBUMIN Q1  03/09/2017        Follow-up Disposition:  Return in about 3 months (around 10/11/2017) for bp, anemia. Reviewed plan of care. Patient has provided input and agrees with goals. The nurse provided the patient and/or family with advanced directive information if needed and encouraged the patient to provide a copy to the office when available. This is a Subsequent Medicare Annual Wellness Visit providing Personalized Prevention Plan Services (PPPS) (Performed 12 months after initial AWV and PPPS )    I have reviewed the patient's medical history in detail and updated the computerized patient record.      History     Past Medical History:   Diagnosis Date    Arrhythmia     Arthritis     Calf pain     when walking    Cancer [facility])     Colon cancer ([facility] [address] 1/15/2010    Dementia 3/10/2010    DM (diabetes mellitus) ([facility] [address] 1/15/2010  Headache     Hearing loss     HTN (hypertension) 1/15/2010    Hyperlipidemia 1/15/2010    Leg swelling     MRSA (methicillin resistant staph aureus) culture positive     4/2013    SOB (shortness of breath)       Past Surgical History:   Procedure Laterality Date    ENDOSCOPY, COLON, DIAGNOSTIC      HX CHOLECYSTECTOMY      HX COLECTOMY  2000     Current Outpatient Prescriptions   Medication Sig Dispense Refill    FERROUS SULFATE (IRON PO) Take  by mouth.  omeprazole (PRILOSEC) 20 mg capsule Take 1 Cap by mouth daily. 90 Cap 0    losartan (COZAAR) 25 mg tablet Take 2 Tabs by mouth daily.  30 Tab 11    amLODIPine (NORVASC) 10 mg tablet take 1 tablet by mouth once daily 30 Tab 11    pravastatin (PRAVACHOL) 20 mg tablet take 1 tablet by mouth once daily (Patient taking differently: take 1 tablet by mouth once daily in the evening) 27 Tab 11     No Known Allergies  Family History   Problem Relation Age of Onset    Diabetes Mother     Hypertension Mother     Heart Disease Mother    [facility] Stroke Mother     Diabetes Sister     Heart Disease Sister     Other Sister      alzheimers    Hypertension Sister     Cancer Sister     Hypertension Sister     Hypertension Brother     Heart Disease Brother      Social History   Substance Use Topics    Smoking status: Never Smoker    Smokeless tobacco: Never Used    Alcohol use No     Patient Active Problem List   Diagnosis Code    HTN (hypertension) I10    Colon cancer ([facility] [address] C18.9    DM (diabetes mellitus) ([facility] [address] E11.9    Hyperlipidemia E78.5    Dementia F03.90    Sebaceous cyst of breast N60.89    MRSA (methicillin resistant staph aureus) culture positive Z22.322    Diabetes mellitus type 2, controlled ([facility] [address] E11.9    Advance care planning Z71.89    Rectal bleeding K62.5    Rectal bleed K62.5       Depression Risk Factor Screening:     PHQ over the last two weeks 7/5/2017   Little interest or pleasure in doing things Not at all   Feeling down, depressed or hopeless Not at all   Total Score PHQ 2 0     Alcohol Risk Factor Screening: On any occasion during the past 3 months, have you had more than 3 drinks containing alcohol? No    Do you average more than 7 drinks per week? No        Functional Ability and Level of Safety:     Hearing Loss   moderate    Activities of Daily Living   Partial assistance. Requires assistance with: bathing and hygiene and dressing    Fall Risk   Fall Risk Assessment, last 12 mths 7/5/2017   Able to walk? Yes   Fall in past 12 months? Yes   Fall with injury? No   Number of falls in past 12 months 1   Fall Risk Score 1     Abuse Screen   Patient is not abused    Review of Systems   Pertinent items are noted in HPI. Physical Examination     Evaluation of Cognitive Function:  Mood/affect:  neutral  Appearance: age appropriate  Family member/caregiver input: daughter        Patient Care Team:  Lonny Evangelista MD as PCP - General  Chris Dewitt RN as Nurse Navigator    Advice/Referrals/Counseling   Education and counseling provided:  Are appropriate based on today's review and evaluation      Assessment/Plan   Paul Strong was seen today for hospital follow up. Diagnoses and all orders for this visit:    Rectal bleed  -     omeprazole (PRILOSEC) 20 mg capsule; Take 1 Cap by mouth daily. Anemia, unspecified type  -     CBC WITH AUTOMATED DIFF  -     FERRITIN  -     IRON PROFILE    Controlled type 2 diabetes mellitus without complication, without long-term current use of insulin (HCC)  -     AMB POC URINE, MICROALBUMIN, SEMIQUANT (3 RESULTS)  -     HEMOGLOBIN A1C WITH EAG    Essential hypertension    Dementia without behavioral disturbance, unspecified dementia type      Follow-up Disposition:  Return in about 3 months (around 10/11/2017) for bp, anemia. Esequiel Salmon

## 2017-07-11 NOTE — PATIENT INSTRUCTIONS
Rectal Bleeding: Care Instructions  Your Care Instructions  Rectal bleeding in small amounts is common. You may see red spotting on toilet paper or drops of blood in the toilet. Rectal bleeding has many possible causes, from something as minor as hemorrhoids to something as serious as colon cancer. You may need more tests to find the cause of your bleeding. Follow-up care is a key part of your treatment and safety. Be sure to make and go to all appointments, and call your doctor if you are having problems. Its also a good idea to know your test results and keep a list of the medicines you take. How can you care for yourself at home? · Avoid aspirin and other nonsteroidal anti-inflammatory drugs (NSAIDs), such as ibuprofen (Advil, Motrin) and naproxen (Aleve). They can cause you to bleed more. Ask your doctor if you can take acetaminophen (Tylenol). Read and follow all instructions on the label. · Use a stool softener that contains bran or psyllium. You can save money by buying bran or psyllium (available in bulk at most health food stores) and sprinkling it on foods or stirring it into fruit juice. You can also use a product such as Metamucil or Citrucel. · Take your medicines exactly as directed. Call your doctor if you think you are having a problem with your medicine. When should you call for help? Call 911 anytime you think you may need emergency care. For example, call if:  · You passed out (lost consciousness). · You pass maroon or very bloody stools. · You vomit blood or what looks like coffee grounds. Call your doctor now or seek immediate medical care if:  · You have severe belly pain. · You have increased bleeding. · You are dizzy or lightheaded, or you feel like you may faint. · Your stools are black and tarlike. Watch closely for changes in your health, and be sure to contact your doctor if:  · You lose weight and do not know why. · You feel tired all the time.   · Your bleeding does not decrease after 2 days. Where can you learn more? Go to http://cee-cecilia.info/. Enter T353 in the search box to learn more about \"Rectal Bleeding: Care Instructions. \"  Current as of: August 9, 2016  Content Version: 11.3  © 0219-8816 [employer]. Care instructions adapted under license by [employer] (which disclaims liability or warranty for this information). If you have questions about a medical condition or this instruction, always ask your healthcare professional. [place] [address] any warranty or liability for your use of this information. Medicare Wellness Visit, Female    The best way to live healthy is to have a healthy lifestyle by eating a well-balanced diet, exercising regularly, limiting alcohol and stopping smoking. Regular physical exams and screening tests are another way to keep healthy. Preventive exams provided by your health care provider can find health problems before they become diseases or illnesses. Preventive services including immunizations, screening tests, monitoring and exams can help you take care of your own health. All people over age 72 should have a pneumovax  and and a prevnar shot to prevent pneumonia. These are once in a lifetime unless you and your provider decide differently. All people over 65 should have a yearly flu shot and a tetanus vaccine every 10 years. A bone mass density to screen for osteoporosis or thinning of the bones should be done every 2 years after 65. Screening for diabetes mellitus with a blood sugar test should be done every year. Glaucoma is a disease of the eye due to increased ocular pressure that can lead to blindness and it should be done every year by an eye professional.    Cardiovascular screening tests that check for elevated lipids (fatty part of blood) which can lead to heart disease and strokes should be done every 5 years.     Colorectal screening that evaluates for blood or polyps in your colon should be done yearly as a stool test or every five years as a flexible sigmoidoscope or every 10 years as a colonoscopy up to age 76. Breast cancer screening with a mammogram is recommended biennially  for women age 54-69. Screening for cervical cancer with a pap smear and pelvic exam is recommended for women after age 72 years every 2 years up to age 79 or when the provider and patient decide to stop. If there is a history of cervical abnormalities or other increased risk for cancer then the test is recommended yearly. Hepatitis C screening is also recommended for anyone born between [birth year] through [address]. A shingles vaccine is also recommended once in a lifetime after age 61. Your Medicare Wellness Exam is recommended annually.     Here is a list of your current Health Maintenance items with a due date:  Health Maintenance Due   Topic Date Due    Albumin Urine Test  03/09/2017    Hemoglobin A1C    07/18/2017

## 2017-07-11 NOTE — MR AVS SNAPSHOT
Visit Information Date & Time Provider Department Dept. Phone Encounter #  
 7/11/2017 11:00 AM MD Ruth Real [phone] [id number] Follow-up Instructions Return in about 3 months (around 10/11/2017) for bp, anemia. Your Appointments 7/19/2017  9:00 AM  
ROUTINE CARE with MD Ruth Real [address] [facility]) Appt Note: 6mth f/u; bp, weight. [address] [address] [phone]  
  
   
 [address] Upcoming Health Maintenance Date Due MICROALBUMIN Q1 3/9/2017 HEMOGLOBIN A1C Q6M 7/18/2017 GLAUCOMA SCREENING Q2Y 8/31/2017* MEDICARE YEARLY EXAM 8/31/2017* EYE EXAM RETINAL OR DILATED Q1 8/31/2017* DTaP/Tdap/Td series (1 - Tdap) 1/18/2018* INFLUENZA AGE 9 TO ADULT 8/1/2017 LIPID PANEL Q1 1/18/2018 FOOT EXAM Q1 2/9/2018 *Topic was postponed. The date shown is not the original due date. Allergies as of 7/11/2017  Review Complete On: 7/11/2017 By: Karina Will MD  
 No Known Allergies Current Immunizations  Reviewed on 2/2/2017 Name Date Influenza High Dose Vaccine PF 1/18/2017 Influenza Vaccine Redia Molina) 9/9/2015 Influenza Vaccine Split 10/31/2012 10:17 AM  
 Pneumococcal Conjugate (PCV-13) 3/9/2016 Pneumococcal Vaccine (Unspecified Type)  Deferred (Patient Refused) Not reviewed this visit You Were Diagnosed With   
  
 Codes Comments Rectal bleed    -  Primary ICD-10-CM: K62.5 ICD-9-CM: 569.3 Anemia, unspecified type     ICD-10-CM: D64.9 ICD-9-CM: 285.9 Controlled type 2 diabetes mellitus without complication, without long-term current use of insulin ([facility].)     ICD-10-CM: E11.9 ICD-9-CM: 250.00 Essential hypertension     ICD-10-CM: I10 
ICD-9-CM: 401.9 Dementia without behavioral disturbance, unspecified dementia type     ICD-10-CM: F03.90 ICD-9-CM: 294.20 Routine general medical examination at a health care facility     ICD-10-CM: Z00.00 ICD-9-CM: V70.0 Screening for alcoholism     ICD-10-CM: Z13.89 ICD-9-CM: V79.1 Screening for depression     ICD-10-CM: Z13.89 ICD-9-CM: V79.0 Vitals BP Pulse Temp Resp Height(growth percentile) Weight(growth percentile) 130/70 (!) 50 98 °F (36.7 °C) (Oral) 16 5' (1.524 m) 107 lb 1.6 oz (48.6 kg) SpO2 BMI OB Status Smoking Status 97% 20.92 kg/m2 Postmenopausal Never Smoker Vitals History BMI and BSA Data Body Mass Index Body Surface Area  
 20.92 kg/m 2 1.43 m 2 Preferred Pharmacy Pharmacy Name Phone [phone], [address] [phone] Your Updated Medication List  
  
   
This list is accurate as of: 7/11/17 11:55 AM.  Always use your most recent med list. amLODIPine 10 mg tablet Commonly known as:  NORVASC  
take 1 tablet by mouth once daily IRON PO Take  by mouth.  
  
 losartan 25 mg tablet Commonly known as:  COZAAR Take 2 Tabs by mouth daily. omeprazole 20 mg capsule Commonly known as:  PRILOSEC Take 1 Cap by mouth daily. pravastatin 20 mg tablet Commonly known as:  PRAVACHOL  
take 1 tablet by mouth once daily Prescriptions Sent to Pharmacy Refills  
 omeprazole (PRILOSEC) 20 mg capsule 0 Sig: Take 1 Cap by mouth daily. Class: Normal  
 [facility]: [facility], [address] Ph #: [phone] Route: Oral  
  
We Performed the Following AMB POC URINE, MICROALBUMIN, SEMIQUANT (3 RESULTS) [51784 CPT(R)] CBC WITH AUTOMATED DIFF [09890 CPT(R)] [address] [[id number] HCPCS] FERRITIN [49166 CPT(R)] HEMOGLOBIN A1C WITH EAG [47488 CPT(R)] IRON PROFILE [id number] CPT(R)] Follow-up Instructions Return in about 3 months (around 10/11/2017) for bp, anemia. Patient Instructions Rectal Bleeding: Care Instructions Your Care Instructions Rectal bleeding in small amounts is common. You may see red spotting on toilet paper or drops of blood in the toilet. Rectal bleeding has many possible causes, from something as minor as hemorrhoids to something as serious as colon cancer. You may need more tests to find the cause of your bleeding. Follow-up care is a key part of your treatment and safety. Be sure to make and go to all appointments, and call your doctor if you are having problems. Its also a good idea to know your test results and keep a list of the medicines you take. How can you care for yourself at home? · Avoid aspirin and other nonsteroidal anti-inflammatory drugs (NSAIDs), such as ibuprofen (Advil, Motrin) and naproxen (Aleve). They can cause you to bleed more. Ask your doctor if you can take acetaminophen (Tylenol). Read and follow all instructions on the label. · Use a stool softener that contains bran or psyllium. You can save money by buying bran or psyllium (available in bulk at most health food stores) and sprinkling it on foods or stirring it into fruit juice. You can also use a product such as Metamucil or Citrucel. · Take your medicines exactly as directed. Call your doctor if you think you are having a problem with your medicine. When should you call for help? Call 911 anytime you think you may need emergency care. For example, call if: 
· You passed out (lost consciousness). · You pass maroon or very bloody stools. · You vomit blood or what looks like coffee grounds. Call your doctor now or seek immediate medical care if: 
· You have severe belly pain. · You have increased bleeding. · You are dizzy or lightheaded, or you feel like you may faint. · Your stools are black and tarlike. Watch closely for changes in your health, and be sure to contact your doctor if: 
· You lose weight and do not know why. · You feel tired all the time. · Your bleeding does not decrease after 2 days. Where can you learn more? Go to http://cee-cecilia.info/. Enter J177 in the search box to learn more about \"Rectal Bleeding: Care Instructions. \" Current as of: August 9, 2016 Content Version: 11.3 © 7395-8262 [employer]. Care instructions adapted under license by [employer] (which disclaims liability or warranty for this information). If you have questions about a medical condition or this instruction, always ask your healthcare professional. [place] [address] any warranty or liability for your use of this information. Medicare Wellness Visit, Female The best way to live healthy is to have a healthy lifestyle by eating a well-balanced diet, exercising regularly, limiting alcohol and stopping smoking. Regular physical exams and screening tests are another way to keep healthy. Preventive exams provided by your health care provider can find health problems before they become diseases or illnesses. Preventive services including immunizations, screening tests, monitoring and exams can help you take care of your own health. All people over age 72 should have a pneumovax  and and a prevnar shot to prevent pneumonia. These are once in a lifetime unless you and your provider decide differently. All people over 65 should have a yearly flu shot and a tetanus vaccine every 10 years. A bone mass density to screen for osteoporosis or thinning of the bones should be done every 2 years after 65. Screening for diabetes mellitus with a blood sugar test should be done every year.  
 
Glaucoma is a disease of the eye due to increased ocular pressure that can lead to blindness and it should be done every year by an eye professional. 
 
Cardiovascular screening tests that check for elevated lipids (fatty part of blood) which can lead to heart disease and strokes should be done every 5 years. Colorectal screening that evaluates for blood or polyps in your colon should be done yearly as a stool test or every five years as a flexible sigmoidoscope or every 10 years as a colonoscopy up to age 76. Breast cancer screening with a mammogram is recommended biennially  for women age 54-69. Screening for cervical cancer with a pap smear and pelvic exam is recommended for women after age 72 years every 2 years up to age 79 or when the provider and patient decide to stop. If there is a history of cervical abnormalities or other increased risk for cancer then the test is recommended yearly. Hepatitis C screening is also recommended for anyone born between [birth year] through [address]. A shingles vaccine is also recommended once in a lifetime after age 61. Your Medicare Wellness Exam is recommended annually. Here is a list of your current Health Maintenance items with a due date: 
Health Maintenance Due Topic Date Due  
 Albumin Urine Test  03/09/2017  Hemoglobin A1C    07/18/2017 Introducing \[facility]\"" & [facility] SERVICES! Cara Anders introduces [employer] patient portal. Now you can access parts of your medical record, email your doctor's office, and request medication refills online. 1. In your internet browser, go to [email or web address] 2. Click on the First Time User? Click Here link in the Sign In box. You will see the New Member Sign Up page. 3. Enter your [facility] Access Code exactly as it appears below. You will not need to use this code after youve completed the sign-up process. If you do not sign up before the expiration date, you must request a new code. · [facility] [facility] Access Code: [email or web address] Expires: 10/9/2017 11:49 AM 
 
4. Enter the last four digits of your Social Security Number (xxxx) and Date of Birth (mm/dd/yyyy) as indicated and click Submit.  You will be taken to the next sign-up page. 5. Create a [employer] ID. This will be your [employer] login ID and cannot be changed, so think of one that is secure and easy to remember. 6. Create a [employer] password. You can change your password at any time. 7. Enter your Password Reset Question and Answer. This can be used at a later time if you forget your password. 8. Enter your e-mail address. You will receive e-mail notification when new information is available in [address]. 9. Click Sign Up. You can now view and download portions of your medical record. 10. Click the Download Summary menu link to download a portable copy of your medical information. If you have questions, please visit the Frequently Asked Questions section of the [employer] website. Remember, [employer] is NOT to be used for urgent needs. For medical emergencies, dial 911. Now available from your iPhone and Android! Please provide this summary of care documentation to your next provider. Your primary care clinician is listed as Andreina Figueroa. If you have any questions after today's visit, please call [phone].

## 2017-07-15 LAB
BASOPHILS # BLD AUTO: 0 X10E3/UL (ref 0–0.2)
BASOPHILS NFR BLD AUTO: 1 %
EOSINOPHIL # BLD AUTO: 0.2 X10E3/UL (ref 0–0.4)
EOSINOPHIL NFR BLD AUTO: 4 %
ERYTHROCYTE [DISTWIDTH] IN BLOOD BY AUTOMATED COUNT: 15.4 % (ref 12.3–15.4)
EST. AVERAGE GLUCOSE BLD GHB EST-MCNC: 105 MG/DL
FERRITIN SERPL-MCNC: 64 NG/ML (ref 15–150)
HBA1C MFR BLD: 5.3 % (ref 4.8–5.6)
HCT VFR BLD AUTO: 38.3 % (ref 34–46.6)
HGB BLD-MCNC: 11.9 G/DL (ref 11.1–15.9)
IMM GRANULOCYTES # BLD: 0 X10E3/UL (ref 0–0.1)
IMM GRANULOCYTES NFR BLD: 0 %
IRON SATN MFR SERPL: 22 % (ref 15–55)
IRON SERPL-MCNC: 58 UG/DL (ref 27–139)
LYMPHOCYTES # BLD AUTO: 1.7 X10E3/UL (ref 0.7–3.1)
LYMPHOCYTES NFR BLD AUTO: 31 %
MCH RBC QN AUTO: 27.3 PG (ref 26.6–33)
MCHC RBC AUTO-ENTMCNC: 31.1 G/DL (ref 31.5–35.7)
MCV RBC AUTO: 88 FL (ref 79–97)
MONOCYTES # BLD AUTO: 0.3 X10E3/UL (ref 0.1–0.9)
MONOCYTES NFR BLD AUTO: 6 %
NEUTROPHILS # BLD AUTO: 3.2 X10E3/UL (ref 1.4–7)
NEUTROPHILS NFR BLD AUTO: 58 %
PLATELET # BLD AUTO: 238 X10E3/UL (ref 150–379)
RBC # BLD AUTO: 4.36 X10E6/UL (ref 3.77–5.28)
TIBC SERPL-MCNC: 265 UG/DL (ref 250–450)
UIBC SERPL-MCNC: 207 UG/DL (ref 118–369)
WBC # BLD AUTO: 5.5 X10E3/UL (ref 3.4–10.8)

## 2017-08-18 ENCOUNTER — PATIENT OUTREACH (OUTPATIENT)
Dept: INTERNAL MEDICINE CLINIC | Age: 82
End: 2017-08-18

## 2017-10-18 ENCOUNTER — OFFICE VISIT (OUTPATIENT)
Dept: INTERNAL MEDICINE CLINIC | Age: 82
End: 2017-10-18

## 2017-10-18 VITALS
RESPIRATION RATE: 16 BRPM | DIASTOLIC BLOOD PRESSURE: 60 MMHG | HEART RATE: 77 BPM | BODY MASS INDEX: 20.22 KG/M2 | SYSTOLIC BLOOD PRESSURE: 110 MMHG | HEIGHT: 60 IN | WEIGHT: 103 LBS | OXYGEN SATURATION: 97 % | TEMPERATURE: 97.9 F

## 2017-10-18 DIAGNOSIS — I10 ESSENTIAL HYPERTENSION: Primary | ICD-10-CM

## 2017-10-18 DIAGNOSIS — R73.01 IFG (IMPAIRED FASTING GLUCOSE): ICD-10-CM

## 2017-10-18 DIAGNOSIS — Z23 ENCOUNTER FOR IMMUNIZATION: ICD-10-CM

## 2017-10-18 DIAGNOSIS — D50.8 OTHER IRON DEFICIENCY ANEMIA: ICD-10-CM

## 2017-10-18 NOTE — PATIENT INSTRUCTIONS
Vaccine Information Statement    Influenza (Flu) Vaccine (Inactivated or Recombinant): What you need to know    Many Vaccine Information Statements are available in [place] and other languages. See www.immunize.org/vis  Hojas de Información Sobre Vacunas están disponibles en Español y en muchos otros idiomas. Visite www.immunize.org/vis    1. Why get vaccinated? Influenza (flu) is a contagious disease that spreads around the [place] every year, usually between October and May. Flu is caused by influenza viruses, and is spread mainly by coughing, sneezing, and close contact. Anyone can get flu. Flu strikes suddenly and can last several days. Symptoms vary by age, but can include:   fever/chills   sore throat   muscle aches   fatigue   cough   headache    runny or stuffy nose    Flu can also lead to pneumonia and blood infections, and cause diarrhea and seizures in children. If you have a medical condition, such as heart or lung disease, flu can make it worse. Flu is more dangerous for some people. Infants and young children, people 72years of age and older, pregnant women, and people with certain health conditions or a weakened immune system are at greatest risk. Each year thousands of people in the [facility] die from flu, and many more are hospitalized. Flu vaccine can:   keep you from getting flu,   make flu less severe if you do get it, and   keep you from spreading flu to your family and other people. 2. Inactivated and recombinant flu vaccines    A dose of flu vaccine is recommended every flu season. Children 6 months through 6years of age may need two doses during the same flu season. Everyone else needs only one dose each flu season.        Some inactivated flu vaccines contain a very small amount of a mercury-based preservative called thimerosal. Studies have not shown thimerosal in vaccines to be harmful, but flu vaccines that do not contain thimerosal are available. There is no live flu virus in flu shots. They cannot cause the flu. There are many flu viruses, and they are always changing. Each year a new flu vaccine is made to protect against three or four viruses that are likely to cause disease in the upcoming flu season. But even when the vaccine doesnt exactly match these viruses, it may still provide some protection    Flu vaccine cannot prevent:   flu that is caused by a virus not covered by the vaccine, or   illnesses that look like flu but are not. It takes about 2 weeks for protection to develop after vaccination, and protection lasts through the flu season. 3. Some people should not get this vaccine    Tell the person who is giving you the vaccine:     If you have any severe, life-threatening allergies. If you ever had a life-threatening allergic reaction after a dose of flu vaccine, or have a severe allergy to any part of this vaccine, you may be advised not to get vaccinated. Most, but not all, types of flu vaccine contain a small amount of egg protein.  If you ever had Guillain-Barré Syndrome (also called GBS). Some people with a history of GBS should not get this vaccine. This should be discussed with your doctor.  If you are not feeling well. It is usually okay to get flu vaccine when you have a mild illness, but you might be asked to come back when you feel better. 4. Risks of a vaccine reaction    With any medicine, including vaccines, there is a chance of reactions. These are usually mild and go away on their own, but serious reactions are also possible. Most people who get a flu shot do not have any problems with it.      Minor problems following a flu shot include:    soreness, redness, or swelling where the shot was given     hoarseness   sore, red or itchy eyes   cough   fever   aches   headache   itching   fatigue  If these problems occur, they usually begin soon after the shot and last 1 or 2 days. More serious problems following a flu shot can include the following:     There may be a small increased risk of Guillain-Barré Syndrome (GBS) after inactivated flu vaccine. This risk has been estimated at 1 or 2 additional cases per million people vaccinated. This is much lower than the risk of severe complications from flu, which can be prevented by flu vaccine.  Young children who get the flu shot along with pneumococcal vaccine (PCV13) and/or DTaP vaccine at the same time might be slightly more likely to have a seizure caused by fever. Ask your doctor for more information. Tell your doctor if a child who is getting flu vaccine has ever had a seizure. Problems that could happen after any injected vaccine:      People sometimes faint after a medical procedure, including vaccination. Sitting or lying down for about 15 minutes can help prevent fainting, and injuries caused by a fall. Tell your doctor if you feel dizzy, or have vision changes or ringing in the ears.  Some people get severe pain in the shoulder and have difficulty moving the arm where a shot was given. This happens very rarely.  Any medication can cause a severe allergic reaction. Such reactions from a vaccine are very rare, estimated at about 1 in a million doses, and would happen within a few minutes to a few hours after the vaccination. As with any medicine, there is a very remote chance of a vaccine causing a serious injury or death. The safety of vaccines is always being monitored. For more information, visit: www.cdc.gov/vaccinesafety/    5. What if there is a serious reaction? What should I look for?  Look for anything that concerns you, such as signs of a severe allergic reaction, very high fever, or unusual behavior.     Signs of a severe allergic reaction can include hives, swelling of the face and throat, difficulty breathing, a fast heartbeat, dizziness, and weakness - usually within a few minutes to a few hours after the vaccination. What should I do?  If you think it is a severe allergic reaction or other emergency that cant wait, call 9-1-1 and get the person to the nearest hospital. Otherwise, call your doctor.  Reactions should be reported to the Vaccine Adverse Event Reporting System (VAERS). Your doctor should file this report, or you can do it yourself through  the VAERS web site at www.vaers. [facility].gov, or by calling 9-980.996.4618. VAERS does not give medical advice. 6. The National Vaccine Injury Compensation Program    The [facility] Vaccine Injury Compensation Program (VICP) is a federal program that was created to compensate people who may have been injured by certain vaccines. Persons who believe they may have been injured by a vaccine can learn about the program and about filing a claim by calling 2-693.847.2654 or visiting the [employer] website at www.[facility].gov/vaccinecompensation. There is a time limit to file a claim for compensation. 7. How can I learn more?  Ask your healthcare provider. He or she can give you the vaccine package insert or suggest other sources of information.  Call your local or state health department.  Contact the Centers for Disease Control and Prevention (CDC):  - Call 7-615.816.4481 (1-800-CDC-INFO) or  - Visit CDCs website at www.cdc.gov/flu    Vaccine Information Statement   Inactivated Influenza Vaccine   8/7/2015  [address] [facility] 039IO-10    Department of Health and Human Services  Centers for Disease Control and Prevention    Office Use Only

## 2017-10-18 NOTE — MR AVS SNAPSHOT
Visit Information Date & Time Provider Department Dept. Phone Encounter #  
 10/18/2017  1:00 PM MD Denzel Perez [phone] [id number] Follow-up Instructions Return in about 6 months (around 4/18/2018) for bp, ifg. Upcoming Health Maintenance Date Due  
 EYE EXAM RETINAL OR DILATED Q1 8/2/1932 GLAUCOMA SCREENING Q2Y 8/2/1987 MICROALBUMIN Q1 3/9/2017 INFLUENZA AGE 9 TO ADULT 8/1/2017 DTaP/Tdap/Td series (1 - Tdap) 1/18/2018* HEMOGLOBIN A1C Q6M 1/11/2018 LIPID PANEL Q1 1/18/2018 FOOT EXAM Q1 2/9/2018 MEDICARE YEARLY EXAM 7/12/2018 *Topic was postponed. The date shown is not the original due date. Allergies as of 10/18/2017  Review Complete On: 10/18/2017 By: Bouchra Breaux LPN No Known Allergies Current Immunizations  Reviewed on 10/18/2017 Name Date Influenza High Dose Vaccine PF  Incomplete, 1/18/2017 Influenza Vaccine Roselynn Sal) 9/9/2015 Influenza Vaccine Split 10/31/2012 10:17 AM  
 Pneumococcal Conjugate (PCV-13) 3/9/2016 Reviewed by Bouchra Breaux LPN on 41/49/5914 at 12:52 PM  
 Reviewed by Bouchra Breaux LPN on 81/72/5840 at 12:52 PM  
You Were Diagnosed With   
  
 Codes Comments Essential hypertension    -  Primary ICD-10-CM: I10 
ICD-9-CM: 401.9 Encounter for immunization     ICD-10-CM: H87 ICD-9-CM: V03.89 Other iron deficiency anemia     ICD-10-CM: D50.8 ICD-9-CM: 280.8 IFG (impaired fasting glucose)     ICD-10-CM: R73.01 
ICD-9-CM: 790.21 Vitals BP Pulse Temp Resp Height(growth percentile) Weight(growth percentile) 110/60 77 97.9 °F (36.6 °C) (Oral) 16 5' (1.524 m) 103 lb (46.7 kg) SpO2 BMI OB Status Smoking Status 97% 20.12 kg/m2 Postmenopausal Never Smoker Vitals History BMI and BSA Data Body Mass Index Body Surface Area  
 20.12 kg/m 2 1.41 m 2 Preferred Pharmacy Pharmacy Name Phone [phone], [address] [phone] Your Updated Medication List  
  
   
This list is accurate as of: 10/18/17  1:20 PM.  Always use your most recent med list. amLODIPine 10 mg tablet Commonly known as:  NORVASC  
take 1 tablet by mouth once daily  
  
 losartan 25 mg tablet Commonly known as:  COZAAR Take 2 Tabs by mouth daily. pravastatin 20 mg tablet Commonly known as:  PRAVACHOL  
take 1 tablet by mouth once daily We Performed the Following ADMIN INFLUENZA VIRUS VAC [ [facility] [place]] INFLUENZA VIRUS VACCINE, HIGH DOSE SEASONAL, PRESERVATIVE FREE [32362 CPT(R)] Follow-up Instructions Return in about 6 months (around 4/18/2018) for bp, ifg.  
  
  
Patient Instructions Vaccine Information Statement Influenza (Flu) Vaccine (Inactivated or Recombinant): What you need to know Many Vaccine Information Statements are available in [place] and other languages. See www.immunize.org/vis Hojas de Información Sobre Vacunas están disponibles en Español y en muchos otros idiomas. Visite www.immunize.org/vis 1. Why get vaccinated? Influenza (flu) is a contagious disease that spreads around the [place] every year, usually between October and May. Flu is caused by influenza viruses, and is spread mainly by coughing, sneezing, and close contact. Anyone can get flu. Flu strikes suddenly and can last several days. Symptoms vary by age, but can include: 
 fever/chills  sore throat  muscle aches  fatigue  cough  headache  runny or stuffy nose Flu can also lead to pneumonia and blood infections, and cause diarrhea and seizures in children. If you have a medical condition, such as heart or lung disease, flu can make it worse. Flu is more dangerous for some people.  Infants and young children, people 72years of age and older, pregnant women, and people with certain health conditions or a weakened immune system are at greatest risk. Each year thousands of people in the [facility] die from flu, and many more are hospitalized. Flu vaccine can: 
 keep you from getting flu, 
 make flu less severe if you do get it, and 
 keep you from spreading flu to your family and other people. 2. Inactivated and recombinant flu vaccines A dose of flu vaccine is recommended every flu season. Children 6 months through 6years of age may need two doses during the same flu season. Everyone else needs only one dose each flu season. Some inactivated flu vaccines contain a very small amount of a mercury-based preservative called thimerosal. Studies have not shown thimerosal in vaccines to be harmful, but flu vaccines that do not contain thimerosal are available. There is no live flu virus in flu shots. They cannot cause the flu. There are many flu viruses, and they are always changing. Each year a new flu vaccine is made to protect against three or four viruses that are likely to cause disease in the upcoming flu season. But even when the vaccine doesnt exactly match these viruses, it may still provide some protection Flu vaccine cannot prevent: 
 flu that is caused by a virus not covered by the vaccine, or 
 illnesses that look like flu but are not. It takes about 2 weeks for protection to develop after vaccination, and protection lasts through the flu season. 3. Some people should not get this vaccine Tell the person who is giving you the vaccine:  If you have any severe, life-threatening allergies. If you ever had a life-threatening allergic reaction after a dose of flu vaccine, or have a severe allergy to any part of this vaccine, you may be advised not to get vaccinated. Most, but not all, types of flu vaccine contain a small amount of egg protein.  If you ever had Guillain-Barré Syndrome (also called GBS). Some people with a history of GBS should not get this vaccine. This should be discussed with your doctor.  If you are not feeling well. It is usually okay to get flu vaccine when you have a mild illness, but you might be asked to come back when you feel better. 4. Risks of a vaccine reaction With any medicine, including vaccines, there is a chance of reactions. These are usually mild and go away on their own, but serious reactions are also possible. Most people who get a flu shot do not have any problems with it. Minor problems following a flu shot include:  
 soreness, redness, or swelling where the shot was given  hoarseness  sore, red or itchy eyes  cough  fever  aches  headache  itching  fatigue If these problems occur, they usually begin soon after the shot and last 1 or 2 days. More serious problems following a flu shot can include the following:  There may be a small increased risk of Guillain-Barré Syndrome (GBS) after inactivated flu vaccine. This risk has been estimated at 1 or 2 additional cases per million people vaccinated. This is much lower than the risk of severe complications from flu, which can be prevented by flu vaccine.  Young children who get the flu shot along with pneumococcal vaccine (PCV13) and/or DTaP vaccine at the same time might be slightly more likely to have a seizure caused by fever. Ask your doctor for more information. Tell your doctor if a child who is getting flu vaccine has ever had a seizure. Problems that could happen after any injected vaccine:  People sometimes faint after a medical procedure, including vaccination. Sitting or lying down for about 15 minutes can help prevent fainting, and injuries caused by a fall. Tell your doctor if you feel dizzy, or have vision changes or ringing in the ears.  
 
 Some people get severe pain in the shoulder and have difficulty moving the arm where a shot was given. This happens very rarely.  Any medication can cause a severe allergic reaction. Such reactions from a vaccine are very rare, estimated at about 1 in a million doses, and would happen within a few minutes to a few hours after the vaccination. As with any medicine, there is a very remote chance of a vaccine causing a serious injury or death. The safety of vaccines is always being monitored. For more information, visit: www.cdc.gov/vaccinesafety/ 
 
5. What if there is a serious reaction? What should I look for?  Look for anything that concerns you, such as signs of a severe allergic reaction, very high fever, or unusual behavior. Signs of a severe allergic reaction can include hives, swelling of the face and throat, difficulty breathing, a fast heartbeat, dizziness, and weakness  usually within a few minutes to a few hours after the vaccination. What should I do?  If you think it is a severe allergic reaction or other emergency that cant wait, call 9-1-1 and get the person to the nearest hospital. Otherwise, call your doctor.  Reactions should be reported to the Vaccine Adverse Event Reporting System (VAERS). Your doctor should file this report, or you can do it yourself through  the VAERS web site at www.vaers. [facility].gov, or by calling 7-499.645.2157. VAERS does not give medical advice. 6. The National Vaccine Injury Compensation Program 
 
The [facility] Vaccine Injury Compensation Program (VICP) is a federal program that was created to compensate people who may have been injured by certain vaccines. Persons who believe they may have been injured by a vaccine can learn about the program and about filing a claim by calling 4-443.752.3368 or visiting the [employer] website at www.[facility].gov/vaccinecompensation. There is a time limit to file a claim for compensation. 7. How can I learn more?  Ask your healthcare provider. He or she can give you the vaccine package insert or suggest other sources of information.  Call your local or state health department.  Contact the Centers for Disease Control and Prevention (CDC): 
- Call 5-712.820.1745 (1-800-CDC-INFO) or 
- Visit CDCs website at www.cdc.gov/flu Vaccine Information Statement Inactivated Influenza Vaccine 8/7/2015 
42 ERNST Agustin [id number] [facility] and [employer] Centers for Disease Control and Prevention Office Use Only Introducing [facility]! Janna Sherif introduces [employer] patient portal. Now you can access parts of your medical record, email your doctor's office, and request medication refills online. 1. In your internet browser, go to [email or web address] 2. Click on the First Time User? Click Here link in the Sign In box. You will see the New Member Sign Up page. 3. Enter your [employer] Access Code exactly as it appears below. You will not need to use this code after youve completed the sign-up process. If you do not sign up before the expiration date, you must request a new code. · [employer] Access Code: [email or web address] Expires: 1/16/2018  1:20 PM 
 
4. Enter the last four digits of your Social Security Number (xxxx) and Date of Birth (mm/dd/yyyy) as indicated and click Submit. You will be taken to the next sign-up page. 5. Create a [employer] ID. This will be your [employer] login ID and cannot be changed, so think of one that is secure and easy to remember. 6. Create a [employer] password. You can change your password at any time. 7. Enter your Password Reset Question and Answer. This can be used at a later time if you forget your password. 8. Enter your e-mail address. You will receive e-mail notification when new information is available in [address] 9. Click Sign Up. You can now view and download portions of your medical record. 10. Click the Download Summary menu link to download a portable copy of your medical information. If you have questions, please visit the Frequently Asked Questions section of the [employer] website. Remember, [employer] is NOT to be used for urgent needs. For medical emergencies, dial 911. Now available from your iPhone and Android! Please provide this summary of care documentation to your next provider. Your primary care clinician is listed as Andreina Figueroa. If you have any questions after today's visit, please call [phone].

## 2017-10-18 NOTE — PROGRESS NOTES
HPI  Ms. Erik Oates is a 80y.o. year old female, she is seen today for follow up HTN, anemia. No complaints, no cp, no n/v/abd pain. Daughter notes sometimes feels sob. Hasn't had any blood in stool. Appetite is stable, some days better than others. Highest a1c in 3 years has been 6.3. Doesn't check glucose. Chief Complaint   Patient presents with    Blood Pressure Check     rm 1// NONfasting//    Anemia    Immunization/Injection        Prior to Admission medications    Medication Sig Start Date End Date Taking? Authorizing Provider   FERROUS SULFATE (IRON PO) Take  by mouth. Yes Historical Provider   losartan (COZAAR) 25 mg tablet Take 2 Tabs by mouth daily. Patient taking differently: Take 25 mg by mouth daily. 7/3/17  Yes Turner Mccracken MD   amLODIPine (NORVASC) 10 mg tablet take 1 tablet by mouth once daily 3/12/17  Yes Janel Ardon MD   pravastatin (PRAVACHOL) 20 mg tablet take 1 tablet by mouth once daily  Patient taking differently: take 1 tablet by mouth once daily in the evening 11/27/16  Yes Janel Ardon MD   omeprazole (PRILOSEC) 20 mg capsule Take 1 Cap by mouth daily. 7/11/17   Janel Ardon MD         No Known Allergies      REVIEW OF SYSTEMS:  Per HPI    PHYSICAL EXAM:  Visit Vitals    /60    Pulse 77    Temp 97.9 °F (36.6 °C) (Oral)    Resp 16    Ht 5' (1.524 m)    Wt 103 lb (46.7 kg)    SpO2 97%    BMI 20.12 kg/m2     Constitutional: Appears well-developed and well-nourished. No distress. HENT:   Head: Normocephalic and atraumatic. Eyes: No scleral icterus. Neck: no lad, no tm, supple   Cardiovascular: Normal S1/S2, regular rhythm. 2/6 systolic murmur without rubs or gallops. Pulmonary/Chest: Effort normal and breath sounds normal. No respiratory distress. No wheezes, rhonchi, or rales. Ext: No edema. Neurological: Alert. Psychiatric: Normal mood and affect.  Behavior is normal.     Lab Results   Component Value Date/Time    Sodium 141 07/01/2017 02:02 AM    Potassium 4.3 07/01/2017 02:02 AM    Chloride 111 07/01/2017 02:02 AM    CO2 24 07/01/2017 02:02 AM    Anion gap 6 07/01/2017 02:02 AM    Glucose 77 07/01/2017 02:02 AM    BUN 16 07/01/2017 02:02 AM    Creatinine 0.96 07/01/2017 02:02 AM    BUN/Creatinine ratio 17 07/01/2017 02:02 AM    GFR est AA >60 07/01/2017 02:02 AM    GFR est non-AA 54 07/01/2017 02:02 AM    Calcium 9.5 07/01/2017 02:02 AM    Bilirubin, total 0.8 06/30/2017 01:28 PM    AST (SGOT) 17 06/30/2017 01:28 PM    Alk. phosphatase 101 06/30/2017 01:28 PM    Protein, total 8.1 06/30/2017 01:28 PM    Albumin 3.4 06/30/2017 01:28 PM    Globulin 4.7 06/30/2017 01:28 PM    A-G Ratio 0.7 06/30/2017 01:28 PM    ALT (SGPT) 19 06/30/2017 01:28 PM     Lab Results   Component Value Date/Time    Hemoglobin A1c 5.3 07/11/2017 11:59 AM    Hemoglobin A1c 5.8 01/18/2017 01:49 PM    Hemoglobin A1c 5.7 03/09/2016 03:38 PM      Lab Results   Component Value Date/Time    Cholesterol, total 184 01/18/2017 01:49 PM    HDL Cholesterol 33 01/18/2017 01:49 PM    LDL, calculated 107 01/18/2017 01:49 PM    VLDL, calculated 44 01/18/2017 01:49 PM    Triglyceride 219 01/18/2017 01:49 PM    CHOL/HDL Ratio 7.0 06/24/2009 11:50 AM          ASSESSMENT/PLAN  Diagnoses and all orders for this visit:    1. Essential hypertension  Controlled on current regimen, continue   2. Encounter for immunization  -     Influenza virus vaccine ([address]) 72 years and older (23895)  -     Administration fee () for Medicare insured patients    3. Other iron deficiency anemia  Suspect was due to rectal tear - no symptoms since - can stop iron has been on 3 mos, prefers not to get labs and I think this is reasonable given obvious nature of the only GI bleed she had  4.  IFG (impaired fasting glucose)  a1c in ifg range at least 3 years - no need to check routinely given age        Health Maintenance Due   Topic Date Due    GLAUCOMA SCREENING Q2Y  08/02/1987    INFLUENZA AGE 9 TO ADULT  08/01/2017        Follow-up Disposition:  Return in about 6 months (around 4/18/2018) for bp, ifg.       Reviewed plan of care. Patient has provided input and agrees with goals. The nurse provided the patient and/or family with advanced directive information if needed and encouraged the patient to provide a copy to the office when available.

## 2017-10-18 NOTE — PROGRESS NOTES
Yadiel Gabriel  Identified pt with two pt identifiers(name and ). Chief Complaint   Patient presents with    Blood Pressure Check     rm 1// NONfasting//    Anemia    Immunization/Injection       1. Have you been to the ER, urgent care clinic since your last visit? Hospitalized since your last visit? NO    2. Have you seen or consulted any other health care providers outside of the [address] [place] [address] since your last visit? Include any pap smears or colon screening.  NO      Dr Jose Kilpatrick notified of reason for visit and vitals    Patient received paperwork for advance directive during previous visit but has not completed at this time     Reviewed record In preparation for visit, huddled with provider and have obtained necessary documentation      Health Maintenance Due   Topic    EYE EXAM RETINAL OR DILATED Q1     GLAUCOMA SCREENING Q2Y     MICROALBUMIN Q1     INFLUENZA AGE 9 TO ADULT        Wt Readings from Last 3 Encounters:   10/18/17 103 lb (46.7 kg)   17 107 lb 1.6 oz (48.6 kg)   17 108 lb 3.9 oz (49.1 kg)     Temp Readings from Last 3 Encounters:   10/18/17 97.9 °F (36.6 °C) (Oral)   17 98 °F (36.7 °C) (Oral)   17 98 °F (36.7 °C)     BP Readings from Last 3 Encounters:   10/18/17 150/73   17 130/70   17 133/85     Pulse Readings from Last 3 Encounters:   10/18/17 77   17 (!) 50   17 81         Learning Assessment:  :     Learning Assessment 2014   PRIMARY LEARNER Child(del)   HIGHEST LEVEL OF EDUCATION - PRIMARY LEARNER  2 YEARS OF COLLEGE   BARRIERS PRIMARY LEARNER NONE   PRIMARY LANGUAGE ENGLISH    NEED No   LEARNER PREFERENCE PRIMARY DEMONSTRATION   ANSWERED BY child   RELATIONSHIP OTHER       Depression Screening:  :     PHQ over the last two weeks 2017   Little interest or pleasure in doing things Not at all   Feeling down, depressed or hopeless Not at all   Total Score PHQ 2 0       Fall Risk Assessment:  :     Fall Risk Assessment, last 12 mths 7/5/2017   Able to walk? Yes   Fall in past 12 months? Yes   Fall with injury? No   Number of falls in past 12 months 1   Fall Risk Score 1       Abuse Screening:  :     Abuse Screening Questionnaire 1/18/2017 9/2/2014   Do you ever feel afraid of your partner? N N   Are you in a relationship with someone who physically or mentally threatens you? N N   Is it safe for you to go home? Y Y               Medication reconciliation up to date and corrected with patient at this time. Patient received Flu Shot in left arm.  Patient tolerated procedure without complaints and received VIS

## 2017-11-15 ENCOUNTER — TELEPHONE (OUTPATIENT)
Dept: INTERNAL MEDICINE CLINIC | Age: 82
End: 2017-11-15

## 2017-11-15 NOTE — TELEPHONE ENCOUNTER
Pts daughter called in requesting a call back from clinical to discuss pts iron pills.  Daughter states since she stopped them she seems cold at all times and she is very concern

## 2017-11-16 NOTE — TELEPHONE ENCOUNTER
Raiza Castellanos reports patient is cold when bathing or when not fully clothed. Per dr bobbi Napoles was advised the patient can continue an OTC iron pill if it helps patient feel better.

## 2018-01-01 ENCOUNTER — HOSPITAL ENCOUNTER (OUTPATIENT)
Age: 83
Setting detail: OBSERVATION
Discharge: HOME | End: 2018-07-04
Attending: EMERGENCY MEDICINE | Admitting: INTERNAL MEDICINE
Payer: MEDICARE

## 2018-01-01 ENCOUNTER — PATIENT OUTREACH (OUTPATIENT)
Dept: INTERNAL MEDICINE CLINIC | Facility: CLINIC | Age: 83
End: 2018-01-01

## 2018-01-01 ENCOUNTER — HOSPITAL ENCOUNTER (EMERGENCY)
Age: 83
End: 2018-12-04
Attending: EMERGENCY MEDICINE
Payer: MEDICARE

## 2018-01-01 ENCOUNTER — APPOINTMENT (OUTPATIENT)
Dept: NUCLEAR MEDICINE | Age: 83
End: 2018-01-01
Attending: INTERNAL MEDICINE
Payer: MEDICARE

## 2018-01-01 ENCOUNTER — APPOINTMENT (OUTPATIENT)
Dept: CT IMAGING | Age: 83
End: 2018-01-01
Attending: INTERNAL MEDICINE
Payer: MEDICARE

## 2018-01-01 ENCOUNTER — TELEPHONE (OUTPATIENT)
Dept: INTERNAL MEDICINE CLINIC | Facility: CLINIC | Age: 83
End: 2018-01-01

## 2018-01-01 ENCOUNTER — OFFICE VISIT (OUTPATIENT)
Dept: INTERNAL MEDICINE CLINIC | Facility: CLINIC | Age: 83
End: 2018-01-01

## 2018-01-01 VITALS
HEART RATE: 83 BPM | OXYGEN SATURATION: 93 % | WEIGHT: 103 LBS | DIASTOLIC BLOOD PRESSURE: 70 MMHG | TEMPERATURE: 97.8 F | RESPIRATION RATE: 16 BRPM | HEIGHT: 62 IN | SYSTOLIC BLOOD PRESSURE: 124 MMHG | BODY MASS INDEX: 18.95 KG/M2

## 2018-01-01 VITALS
OXYGEN SATURATION: 96 % | WEIGHT: 101.41 LBS | HEART RATE: 55 BPM | RESPIRATION RATE: 18 BRPM | BODY MASS INDEX: 18.66 KG/M2 | DIASTOLIC BLOOD PRESSURE: 70 MMHG | SYSTOLIC BLOOD PRESSURE: 129 MMHG | TEMPERATURE: 97.6 F | HEIGHT: 62 IN

## 2018-01-01 DIAGNOSIS — I10 ESSENTIAL HYPERTENSION: ICD-10-CM

## 2018-01-01 DIAGNOSIS — I46.9 CARDIOPULMONARY ARREST (HCC): Primary | ICD-10-CM

## 2018-01-01 DIAGNOSIS — K62.5 BRBPR (BRIGHT RED BLOOD PER RECTUM): Primary | ICD-10-CM

## 2018-01-01 DIAGNOSIS — D64.9 LOW HEMOGLOBIN: ICD-10-CM

## 2018-01-01 DIAGNOSIS — F03.90 DEMENTIA WITHOUT BEHAVIORAL DISTURBANCE, UNSPECIFIED DEMENTIA TYPE: ICD-10-CM

## 2018-01-01 DIAGNOSIS — D64.9 ANEMIA, UNSPECIFIED TYPE: ICD-10-CM

## 2018-01-01 DIAGNOSIS — K62.5 RECTAL BLEEDING: Primary | ICD-10-CM

## 2018-01-01 LAB
ABO + RH BLD: NORMAL
ALBUMIN SERPL-MCNC: 3 G/DL (ref 3.5–5)
ALBUMIN/GLOB SERPL: 0.7 {RATIO} (ref 1.1–2.2)
ALP SERPL-CCNC: 91 U/L (ref 45–117)
ALT SERPL-CCNC: 20 U/L (ref 12–78)
ANION GAP SERPL CALC-SCNC: 8 MMOL/L (ref 5–15)
AST SERPL-CCNC: 24 U/L (ref 15–37)
BASOPHILS # BLD: 0 K/UL (ref 0–0.1)
BASOPHILS NFR BLD: 0 % (ref 0–1)
BILIRUB SERPL-MCNC: 0.5 MG/DL (ref 0.2–1)
BLOOD GROUP ANTIBODIES SERPL: NORMAL
BUN SERPL-MCNC: 14 MG/DL (ref 6–20)
BUN/CREAT SERPL: 15 (ref 12–20)
CALCIUM SERPL-MCNC: 8.9 MG/DL (ref 8.5–10.1)
CHLORIDE SERPL-SCNC: 111 MMOL/L (ref 97–108)
CO2 SERPL-SCNC: 24 MMOL/L (ref 21–32)
CREAT SERPL-MCNC: 0.96 MG/DL (ref 0.55–1.02)
DIFFERENTIAL METHOD BLD: ABNORMAL
EOSINOPHIL # BLD: 0.3 K/UL (ref 0–0.4)
EOSINOPHIL NFR BLD: 6 % (ref 0–7)
ERYTHROCYTE [DISTWIDTH] IN BLOOD BY AUTOMATED COUNT: 15.3 % (ref 11.5–14.5)
GLOBULIN SER CALC-MCNC: 4.2 G/DL (ref 2–4)
GLUCOSE SERPL-MCNC: 121 MG/DL (ref 65–100)
HCT VFR BLD AUTO: 30.3 % (ref 35–47)
HCT VFR BLD AUTO: 31.4 % (ref 35–47)
HEMOCCULT STL QL: POSITIVE
HGB BLD-MCNC: 8.9 G/DL (ref 11.5–16)
HGB BLD-MCNC: 9.4 G/DL (ref 11.5–16)
IMM GRANULOCYTES # BLD: 0 K/UL (ref 0–0.04)
IMM GRANULOCYTES NFR BLD AUTO: 0 % (ref 0–0.5)
INR PPP: 1 (ref 0.9–1.1)
LYMPHOCYTES # BLD: 1.7 K/UL (ref 0.8–3.5)
LYMPHOCYTES NFR BLD: 32 % (ref 12–49)
MCH RBC QN AUTO: 25.3 PG (ref 26–34)
MCHC RBC AUTO-ENTMCNC: 29.9 G/DL (ref 30–36.5)
MCV RBC AUTO: 84.6 FL (ref 80–99)
MONOCYTES # BLD: 0.5 K/UL (ref 0–1)
MONOCYTES NFR BLD: 10 % (ref 5–13)
NEUTS SEG # BLD: 2.7 K/UL (ref 1.8–8)
NEUTS SEG NFR BLD: 52 % (ref 32–75)
NRBC # BLD: 0 K/UL (ref 0–0.01)
NRBC BLD-RTO: 0 PER 100 WBC
PLATELET # BLD AUTO: 234 K/UL (ref 150–400)
PMV BLD AUTO: 10.5 FL (ref 8.9–12.9)
POTASSIUM SERPL-SCNC: 4.3 MMOL/L (ref 3.5–5.1)
PROT SERPL-MCNC: 7.2 G/DL (ref 6.4–8.2)
PROTHROMBIN TIME: 10.4 SEC (ref 9–11.1)
RBC # BLD AUTO: 3.71 M/UL (ref 3.8–5.2)
SODIUM SERPL-SCNC: 143 MMOL/L (ref 136–145)
SPECIMEN EXP DATE BLD: NORMAL
WBC # BLD AUTO: 5.2 K/UL (ref 3.6–11)

## 2018-01-01 PROCEDURE — 82272 OCCULT BLD FECES 1-3 TESTS: CPT | Performed by: EMERGENCY MEDICINE

## 2018-01-01 PROCEDURE — 99284 EMERGENCY DEPT VISIT MOD MDM: CPT

## 2018-01-01 PROCEDURE — 99282 EMERGENCY DEPT VISIT SF MDM: CPT

## 2018-01-01 PROCEDURE — 36415 COLL VENOUS BLD VENIPUNCTURE: CPT | Performed by: EMERGENCY MEDICINE

## 2018-01-01 PROCEDURE — 99218 HC RM OBSERVATION: CPT

## 2018-01-01 PROCEDURE — 92950 HEART/LUNG RESUSCITATION CPR: CPT

## 2018-01-01 PROCEDURE — 86900 BLOOD TYPING SEROLOGIC ABO: CPT | Performed by: EMERGENCY MEDICINE

## 2018-01-01 PROCEDURE — 85025 COMPLETE CBC W/AUTO DIFF WBC: CPT | Performed by: EMERGENCY MEDICINE

## 2018-01-01 PROCEDURE — 93005 ELECTROCARDIOGRAM TRACING: CPT

## 2018-01-01 PROCEDURE — 80053 COMPREHEN METABOLIC PANEL: CPT | Performed by: EMERGENCY MEDICINE

## 2018-01-01 PROCEDURE — 78278 ACUTE GI BLOOD LOSS IMAGING: CPT

## 2018-01-01 PROCEDURE — 85018 HEMOGLOBIN: CPT | Performed by: INTERNAL MEDICINE

## 2018-01-01 PROCEDURE — 74011250636 HC RX REV CODE- 250/636: Performed by: EMERGENCY MEDICINE

## 2018-01-01 PROCEDURE — 85610 PROTHROMBIN TIME: CPT | Performed by: EMERGENCY MEDICINE

## 2018-01-01 RX ORDER — AMLODIPINE BESYLATE 10 MG/1
TABLET ORAL
Qty: 30 TAB | Refills: 11 | Status: ON HOLD | OUTPATIENT
Start: 2018-01-01 | End: 2018-01-01

## 2018-01-01 RX ORDER — AMLODIPINE BESYLATE 10 MG/1
10 TABLET ORAL DAILY
Qty: 30 TAB | Refills: 11 | Status: SHIPPED
Start: 2018-01-01

## 2018-01-01 RX ORDER — ONDANSETRON 2 MG/ML
4 INJECTION INTRAMUSCULAR; INTRAVENOUS
Status: DISCONTINUED | OUTPATIENT
Start: 2018-01-01 | End: 2018-01-01 | Stop reason: HOSPADM

## 2018-01-01 RX ORDER — BARIUM SULFATE 20 MG/ML
900 SUSPENSION ORAL ONCE
Status: DISCONTINUED | OUTPATIENT
Start: 2018-01-01 | End: 2018-01-01 | Stop reason: HOSPADM

## 2018-01-01 RX ORDER — LOSARTAN POTASSIUM 25 MG/1
25 TABLET ORAL DAILY
Qty: 30 TAB | Refills: 11 | Status: SHIPPED
Start: 2018-01-01

## 2018-01-01 RX ORDER — SODIUM CHLORIDE 0.9 % (FLUSH) 0.9 %
5-10 SYRINGE (ML) INJECTION EVERY 8 HOURS
Status: DISCONTINUED | OUTPATIENT
Start: 2018-01-01 | End: 2018-01-01 | Stop reason: HOSPADM

## 2018-01-01 RX ORDER — ACETAMINOPHEN 325 MG/1
650 TABLET ORAL
Status: DISCONTINUED | OUTPATIENT
Start: 2018-01-01 | End: 2018-01-01 | Stop reason: HOSPADM

## 2018-01-01 RX ORDER — LOSARTAN POTASSIUM 25 MG/1
TABLET ORAL
Qty: 30 TAB | Refills: 11 | Status: ON HOLD | OUTPATIENT
Start: 2018-01-01 | End: 2018-01-01

## 2018-01-01 RX ORDER — LORAZEPAM 2 MG/ML
1 INJECTION INTRAMUSCULAR ONCE
Status: DISCONTINUED | OUTPATIENT
Start: 2018-01-01 | End: 2018-01-01 | Stop reason: HOSPADM

## 2018-01-01 RX ORDER — EPINEPHRINE 0.1 MG/ML
INJECTION INTRACARDIAC; INTRAVENOUS
Status: COMPLETED | OUTPATIENT
Start: 2018-01-01 | End: 2018-01-01

## 2018-01-01 RX ORDER — SODIUM CHLORIDE 0.9 % (FLUSH) 0.9 %
5-10 SYRINGE (ML) INJECTION AS NEEDED
Status: DISCONTINUED | OUTPATIENT
Start: 2018-01-01 | End: 2018-01-01 | Stop reason: HOSPADM

## 2018-01-01 RX ADMIN — EPINEPHRINE 1 MG: 0.1 INJECTION, SOLUTION ENDOTRACHEAL; INTRACARDIAC; INTRAVENOUS at 01:30

## 2018-01-01 RX ADMIN — EPINEPHRINE 1 MG: 0.1 INJECTION, SOLUTION ENDOTRACHEAL; INTRACARDIAC; INTRAVENOUS at 01:33

## 2018-01-01 RX ADMIN — Medication 10 ML: at 08:23

## 2018-01-01 RX ADMIN — EPINEPHRINE 1 MG: 0.1 INJECTION, SOLUTION ENDOTRACHEAL; INTRACARDIAC; INTRAVENOUS at 01:27

## 2018-07-03 NOTE — IP AVS SNAPSHOT
[address] 
[phone] Patient: Constantin Ya MRN: [id number] FOV:9/9/8304 About your hospitalization You were admitted on:  July 4, 2018 You last received care in the:  [facility] 2 GENERAL SURGERY You were discharged on:  July 4, 2018 Why you were hospitalized Your primary diagnosis was:  Not on File Your diagnoses also included:  Brbpr (Bright Red Blood Per Rectum) Follow-up Information Follow up With Details Comments Contact Info Ligia Ballard MD In 3 days  [address] [facility] [phone] Discharge Orders None A check marii indicates which time of day the medication should be taken. My Medications ASK your doctor about these medications Instructions Each Dose to Equal  
 Morning Noon Evening Bedtime  
 amLODIPine 10 mg tablet Commonly known as:  Suzon Salle Your last dose was: Your next dose is:    
   
   
 take 1 tablet by mouth once daily  
     
   
   
   
  
 losartan 25 mg tablet Commonly known as:  COZAAR Your last dose was: Your next dose is:    
   
   
 take 1 tablet by mouth once daily  
     
   
   
   
  
 pravastatin 20 mg tablet Commonly known as:  PRAVACHOL Your last dose was: Your next dose is:    
   
   
 take 1 tablet by mouth once daily Discharge Instructions None ACO Transitions of Care [facility] offers a voluntary care coordination program to provide high quality service and care to [facility] fee-for-service beneficiaries. Adelina Coelho was designed to help you enhance your health and well-being through the following services: ? Transitions of Care  support for individuals who are transitioning from one care setting to another (example: Hospital to home). ? Chronic and Complex Care Coordination  support for individuals and caregivers of those with serious or chronic illnesses or with more than one chronic (ongoing) condition and those who take a number of different medications. If you meet specific medical criteria, a [facility] [address] may call you directly to coordinate your care with your primary care physician and your other care providers. For questions about the [facility] programs, please, contact your physicians office. For general questions or additional information about Accountable Care Organizations: 
Please visit www.medicare.gov/acos. html or call 1-800-MEDICARE (2-836.966.7464) TTY users should call 7-335.307.6363. Introducing \[facility]\"" & HEALTH SERVICES! [place] introduces [employer]Calls patient portal. Now you can access parts of your medical record, email your doctor's office, and request medication refills online. 1. In your internet browser, go to [email or web address] 2. Click on the First Time User? Click Here link in the Sign In box. You will see the New Member Sign Up page. 3. Enter your CelebCalls Access Code exactly as it appears below. You will not need to use this code after youve completed the sign-up process. If you do not sign up before the expiration date, you must request a new code. · CelebCalls Access Code: [email or web address] Expires: 10/2/2018  3:14 PM 
 
4. Enter the last four digits of your Social Security Number (xxxx) and Date of Birth (mm/dd/yyyy) as indicated and click Submit. You will be taken to the next sign-up page. 5. Create a CelebCalls ID. This will be your CelebCalls login ID and cannot be changed, so think of one that is secure and easy to remember. 6. Create a CelebCalls password. You can change your password at any time. 7. Enter your Password Reset Question and Answer.  This can be used at a later time if you forget your password. 8. Enter your e-mail address. You will receive e-mail notification when new information is available in [address] 9. Click Sign Up. You can now view and download portions of your medical record. 10. Click the Download Summary menu link to download a portable copy of your medical information. If you have questions, please visit the Frequently Asked Questions section of the Cloud[employer] website. Remember, Shidonni is NOT to be used for urgent needs. For medical emergencies, dial 911. Now available from your iPhone and Android! Introducing Gatito Veliz As a Kristine Donny patient, I wanted to make you aware of our electronic visit tool called Gatito Veliz. Kristine Hines 24/7 allows you to connect within minutes with a medical provider 24 hours a day, seven days a week via a mobile device or tablet or logging into a secure website from your computer. You can access Gatito Veliz from anywhere in the [place]. A virtual visit might be right for you when you have a simple condition and feel like you just dont want to get out of bed, or cant get away from work for an appointment, when your regular Kristine Hines provider is not available (evenings, weekends or holidays), or when youre out of town and need minor care. Electronic visits cost only $49 and if the Kristine Hines 24/7 provider determines a prescription is needed to treat your condition, one can be electronically transmitted to a nearby pharmacy*. Please take a moment to enroll today if you have not already done so. The enrollment process is free and takes just a few minutes. To enroll, please download the Kristine Gilluel 24/7 reinaldo to your tablet or phone, or visit [email or web address] to enroll on your computer.    
And, as an [address] [place] [address] patient with a [employer] account, the results of your visits will be scanned into your electronic medical record and your primary care provider will be able to view the scanned results. We urge you to continue to see your regular [employer] provider for your ongoing medical care. And while your primary care provider may not be the one available when you seek a Cozy Cloud virtual visit, the peace of mind you get from getting a real diagnosis real time can be priceless. For more information on Cozy Cloud, view our Frequently Asked Questions (FAQs) at [email or web address]. Sincerely, 
 
Saray Simmons MD 
Chief Medical Officer [employer] *:  certain medications cannot be prescribed via Cozy Cloud Providers Seen During Your Hospitalization Provider Specialty Primary office phone Barry Carson MD Emergency Medicine [phone] Apple Anton MD Internal Medicine [phone] Your Primary Care Physician (PCP) Primary Care Physician Office Phone Office Fax [place], [address] [facility] [phone] You are allergic to the following No active allergies Recent Documentation Height Weight BMI OB Status Smoking Status 1.575 m 46 kg 18.55 kg/m2 Postmenopausal Never Smoker Emergency Contacts Name Discharge Info Relation Home Work Mobile Anna Jang CAREGIVER [3] Child [2] [phone] Una Hartmann DISCHARGE CAREGIVER [3] Child [2] [phone] Patient Belongings The following personal items are in your possession at time of discharge: 
     Visual Aid: None Please provide this summary of care documentation to your next provider. Signatures-by signing, you are acknowledging that this After Visit Summary has been reviewed with you and you have received a copy. Patient Signature:  ____________________________________________________________ Date:  ____________________________________________________________  
  
Janyth Mins Provider Signature:  ____________________________________________________________ Date:  ____________________________________________________________

## 2018-07-03 NOTE — IP AVS SNAPSHOT
[address] [facility]. 
[phone] Patient: Sarah Oropeza MRN: [id number] VBP:6/6/7206 About your hospitalization You were admitted on:  July 4, 2018 You last received care in the:  [facility] 2 GENERAL SURGERY You were discharged on:  July 4, 2018 Why you were hospitalized Your primary diagnosis was:  Not on File Your diagnoses also included:  Brbpr (Bright Red Blood Per Rectum) Follow-up Information Follow up With Details Comments Contact Info Trell Cueto MD In 3 days  [address] [id number] [phone] Discharge Orders None A check marii indicates which time of day the medication should be taken. My Medications CHANGE how you take these medications Instructions Each Dose to Equal  
 Morning Noon Evening Bedtime  
 amLODIPine 10 mg tablet Commonly known as:  Abhay Parker What changed:  See the new instructions. Your last dose was: Your next dose is: Take 1 Tab by mouth daily. Start taking 7/7 if blood pressure remain above 120  
 10 mg  
    
   
   
   
  
 losartan 25 mg tablet Commonly known as:  COZAAR What changed:  See the new instructions. Your last dose was: Your next dose is: Take 1 Tab by mouth daily. Start taking 7/7 if blood pressure remain above 120  
 25 mg CONTINUE taking these medications Instructions Each Dose to Equal  
 Morning Noon Evening Bedtime  
 pravastatin 20 mg tablet Commonly known as:  PRAVACHOL Your last dose was: Your next dose is:    
   
   
 take 1 tablet by mouth once daily Where to Get Your Medications Information on where to get these meds will be given to you by the nurse or doctor. ! Ask your nurse or doctor about these medications  
  amLODIPine 10 mg tablet losartan 25 mg tablet Discharge Instructions Patient Discharge Instructions Luke Linares / [id number] : 1922 Admitted 7/3/2018 Discharged: 2018 DISCHARGE DIAGNOSIS:  
 
 
Bleeding - avoid constipation , stay well hydrated Hold blood pressure medications for 2 days. If blood pressure remain above 120 ok to start it back. Take Home Medications General drug facts If you have a very bad allergy, wear an allergy ID at all times. It is important that you take the medication exactly as they are prescribed. Keep your medication in the bottles provided by the pharmacist. 
Keep a list of all your drugs (prescription, natural products, vitamins, OTC) with you. Give this list to your doctor. Do not take other medications without consulting your doctor. Do not share your drugs with others and do not take anyone else's drugs. Keep all drugs out of the reach of children and pets. Most drugs may be thrown away in household trash after mixing with coffee grounds or abhishek litter and sealing in a plastic bag. Keep a list Call your doctor for help with any side effects. If in the [place] you may also call the FDA at 8-488-GWD-9988 Talk with the doctor before starting any new drug, including OTC, natural products, or vitamins. What to do at [facility] 1. Recommended diet:regular 2. Recommended activity: Activity as tolerated 3. If you experience any of the following symptoms then please call your primary care physician or return to the emergency room if you cannot get hold of your doctor: weakness/dizziness/fall / extensive bleeding 4. Lab work: follow blood counts in 3 days with PCP 5. Bring these papers with you to your follow up appointments. The papers will help your doctors be sure to continue the care plan from the hospital. 
 
 
Follow-up with:  
PCP: Jolene Canada MD 
Follow-up Information Follow up With Details Comments Contact Info Noreen Pepper MD In 3 days  [address] [address] [phone] Please call for your own appointment Information obtained by : 
I understand that if any problems occur once I am at home I am to contact my physician. I understand and acknowledge receipt of the instructions indicated above. Physician's or R.N.'s Signature                                                                  Date/Time Patient or Representative Signature                                                          Date/Time 
 
 
 
 
ACO Transitions of Care [facility] offers a voluntary care coordination program to provide high quality service and care to [facility] fee-for-service beneficiaries. Carlee Russell was designed to help you enhance your health and well-being through the following services: ? Transitions of Care  support for individuals who are transitioning from one care setting to another (example: Hospital to home). ? Chronic and Complex Care Coordination  support for individuals and caregivers of those with serious or chronic illnesses or with more than one chronic (ongoing) condition and those who take a number of different medications. If you meet specific medical criteria, a [facility] [address] may call you directly to coordinate your care with your primary care physician and your other care providers. For questions about the [facility] programs, please, contact your physicians office. For general questions or additional information about Accountable Care Organizations: 
Please visit www.medicare.gov/acos. html or call 1-800-MEDICARE (3-723.684.5127) TTY users should call 5-100.740.5252. Introducing [facility] & HEALTH SERVICES! [facility] introduces [employer] patient portal. Now you can access parts of your medical record, email your doctor's office, and request medication refills online. 1. In your internet browser, go to [email or web address] 2. Click on the First Time User? Click Here link in the Sign In box. You will see the New Member Sign Up page. 3. Enter your [employer] Access Code exactly as it appears below. You will not need to use this code after youve completed the sign-up process. If you do not sign up before the expiration date, you must request a new code. · [employer] Access Code: [email or web address] Expires: 10/2/2018  3:14 PM 
 
4. Enter the last four digits of your Social Security Number (xxxx) and Date of Birth (mm/dd/yyyy) as indicated and click Submit. You will be taken to the next sign-up page. 5. Create a [employer] ID. This will be your [employer] login ID and cannot be changed, so think of one that is secure and easy to remember. 6. Create a [employer] password. You can change your password at any time. 7. Enter your Password Reset Question and Answer. This can be used at a later time if you forget your password. 8. Enter your e-mail address. You will receive e-mail notification when new information is available in [address] 9. Click Sign Up. You can now view and download portions of your medical record. 10. Click the Download Summary menu link to download a portable copy of your medical information. If you have questions, please visit the Frequently Asked Questions section of the [employer] website. Remember, [employer] is NOT to be used for urgent needs. For medical emergencies, dial 911. Now available from your iPhone and Android! Introducing Gatito Veliz As a Vivian Castillo patient, I wanted to make you aware of our electronic visit tool called Gatito Veliz. Vivian Castillo 24/7 allows you to connect within minutes with a medical provider 24 hours a day, seven days a week via a mobile device or tablet or logging into a secure website from your computer. You can access Gatito Veliz from anywhere in the [place]. A virtual visit might be right for you when you have a simple condition and feel like you just dont want to get out of bed, or cant get away from work for an appointment, when your regular Vivian Castillo provider is not available (evenings, weekends or holidays), or when youre out of town and need minor care. Electronic visits cost only $49 and if the Vivian Castillo 24/7 provider determines a prescription is needed to treat your condition, one can be electronically transmitted to a nearby pharmacy*. Please take a moment to enroll today if you have not already done so. The enrollment process is free and takes just a few minutes. To enroll, please download the Vivian Castillo 24/7 reinaldo to your tablet or phone, or visit [email or web address] to enroll on your computer. And, as an [address] [place] [address] patient with a [employer] account, the results of your visits will be scanned into your electronic medical record and your primary care provider will be able to view the scanned results. We urge you to continue to see your regular Vivian Castillo provider for your ongoing medical care. And while your primary care provider may not be the one available when you seek a Gatito Veliz virtual visit, the peace of mind you get from getting a real diagnosis real time can be priceless. For more information on Gatito Veliz, view our Frequently Asked Questions (FAQs) at [email or web address]. Sincerely, 
 
Gunjan Olea MD 
Chief Medical Officer [employer] *:  certain medications cannot be prescribed via Gatito Veliz Providers Seen During Your Hospitalization Provider Specialty Primary office phone Veronica Chong MD Emergency Medicine [phone] Shannon Camarillo MD Internal Medicine [phone] Your Primary Care Physician (PCP) Primary Care Physician Office Phone Office Fax [facility] [phone] You are allergic to the following No active allergies Recent Documentation Height Weight BMI OB Status Smoking Status 1.575 m 46 kg 18.55 kg/m2 Postmenopausal Never Smoker Emergency Contacts Name Discharge Info Relation Home Work Mobile Makayla Factor CAREGIVER [3] Child [2] [phone] HartmannUna DISCHARGE CAREGIVER [3] Child [2] [phone] Patient Belongings The following personal items are in your possession at time of discharge: 
     Visual Aid: None Please provide this summary of care documentation to your next provider. Signatures-by signing, you are acknowledging that this After Visit Summary has been reviewed with you and you have received a copy. Patient Signature:  ____________________________________________________________ Date:  ____________________________________________________________  
  
[facility] Provider Signature:  ____________________________________________________________ Date:  ____________________________________________________________

## 2018-07-04 PROBLEM — K62.5 BRBPR (BRIGHT RED BLOOD PER RECTUM): Status: ACTIVE | Noted: 2018-01-01

## 2018-07-04 NOTE — ED NOTES
TRANSFER - OUT REPORT:    Verbal report given to Gavin Hernández on Marisol Eans  being transferred to [facility] for routine progression of care       Report consisted of patients Situation, Background, Assessment and   Recommendations(SBAR). Information from the following report(s) SBAR, Kardex, ED Summary, Intake/Output and MAR was reviewed with the receiving nurse. Lines:   Peripheral IV 07/04/18 Right Hand (Active)   Site Assessment Clean, dry, & intact 7/4/2018 12:35 AM   Phlebitis Assessment 0 7/4/2018 12:35 AM   Infiltration Assessment 0 7/4/2018 12:35 AM   Dressing Type Tape;Transparent 7/4/2018 12:35 AM   Hub Color/Line Status Blue;Patent 7/4/2018 12:35 AM   Action Taken Blood drawn 7/4/2018 12:35 AM        Opportunity for questions and clarification was provided.       Patient transported with:   [employer]

## 2018-07-04 NOTE — PROGRESS NOTES
GI note:  Notified of agitation, pulled out IV. Cannot get bleeding scan and now possibly CT scan. If IV access is not possible I suggest observation only. I spoke with daughter,nuc med tech and pt RN.     SMA sarita MD

## 2018-07-04 NOTE — PROGRESS NOTES
Patient combative, aggitated. Nuclear med test not done. Just got ativan ordered. Patient back in room?

## 2018-07-04 NOTE — PROGRESS NOTES
Informed patient's nurse, Francesco Sánchez RN, that patient needs a 20g or larger AC IV access for CT GI Bleed study. Nurse will let CT know when IV access is present.

## 2018-07-04 NOTE — PROGRESS NOTES
DC instructions provided to daughter  Pt placed in wheelchair and escorted to main entrance, assisted pt into POV

## 2018-07-04 NOTE — H&P
Hospitalist Admission Note    NAME: Miladis Deal   :  1922   MRN:  [id number]     Date/Time:  2018 3:44 AM    Patient PCP: Refugio Luis MD  ______________________________________________________________________  Given the patient's current clinical presentation, I have a high level of concern for decompensation if discharged from the emergency department. Complex decision making was performed, which includes reviewing the patient's available past medical records, laboratory results, and x-ray films. My assessment of this patient's clinical condition and my plan of care is as follows. Assessment / Plan:  BRBPR x1 occurrence on evening of 7/3/18:  -admit to Observation, telemetry; if there is a recurrence of this or decrease in Hgb then change to Inpatient status  -no IVF's as I do not want this to confound serial hemoglobin measurements and patient is normotensive with normal HR thus there is not a strong indication for IVF's at this time  -regular diet ordered  -consult GI  -recheck Hgb this am (4 am) and again at 16:00 and again tomorrow am; check Hgb more frequently if indicated    Hypertension: currently normotensive:  -hold home losartan and norvasc while monitoring for bleed    Hyperlipidemia:  -hold statin at this time    Dementia    History of Colon Cancer s/p resection 15 years ago    Code Status: DNR; will need DDNR completed prior to discharge  Surrogate Decision Maker: Daughter, Gatito Baltazar, is surrogate decision maker    DVT Prophylaxis: SCDs  GI Prophylaxis: not indicated    Baseline: lives with daughter, able to ambulate without assistance      Subjective:   CHIEF COMPLAINT: bright red blood in stool    HISTORY OF PRESENT ILLNESS:     Miladis Deal is a 80 y.o.  female who presents with BRBPR around 9 pm on 7/3/18.  The majority of my history was obtained from patient's daughter, Gatito Baltazar, as patient has dementia and is not a good historian. Patient normally has loose brown stools. She woke up yesterday evening and agreed that she needed to use the restroom when prompted by her daughter. In the restroom patient had a sizeable amount of bright red blood in the toilet. Patient did not complain of abdominal pain. She did not appear SOB or diaphoretic. She did not complain of or look lightheaded. Patient denies any complaints this evening. Of historic note patient had a similar issue with this about 1 year ago and was monitored and discharged home without intervention as her symptoms resolved and Hgb remained essentially stable. We were asked to admit for work up and evaluation of the above problems.      Past Medical History:   Diagnosis Date    Arrhythmia     Arthritis     Calf pain     when walking    Cancer [facility])     Colon cancer ([facility] 1/15/2010    Dementia 3/10/2010    Diabetes mellitus type 2, controlled ([facility] 3/9/2016    DM (diabetes mellitus) ([facility] 1/15/2010    DM (diabetes mellitus) ([facility] 1/15/2010    Diet controlled      Headache(784.0)     Hearing loss     HTN (hypertension) 1/15/2010    Hyperlipidemia 1/15/2010    Leg swelling     MRSA (methicillin resistant staph aureus) culture positive     4/2013    SOB (shortness of breath)         Past Surgical History:   Procedure Laterality Date    ENDOSCOPY, COLON, DIAGNOSTIC      HX CHOLECYSTECTOMY      HX COLECTOMY  2000       Social History   Substance Use Topics    Smoking status: Never Smoker    Smokeless tobacco: Never Used    Alcohol use No        Family History   Problem Relation Age of Onset    Diabetes Mother     Hypertension Mother     Heart Disease Mother    [address] [place] [address] Иван Stroke Mother     Diabetes Sister     Heart Disease Sister     Other Sister      alzheimers    Hypertension Sister     Cancer Sister     Hypertension Sister     Hypertension Brother     Heart Disease Brother      No Known Allergies     Prior to Admission medications    Medication Sig Start Date End Date Taking? Authorizing Provider   amLODIPine (NORVASC) 10 mg tablet take 1 tablet by mouth once daily 3/7/18   Kenrick Leblanc MD   losartan (COZAAR) 25 mg tablet take 1 tablet by mouth once daily 3/6/18   Kenrick Leblanc MD   pravastatin (PRAVACHOL) 20 mg tablet take 1 tablet by mouth once daily 12/7/17   Kenrick Leblanc MD       REVIEW OF SYSTEMS:     I am not able to complete the review of systems because: The patient is intubated and sedated    The patient has altered mental status due to his acute medical problems    The patient has baseline aphasia from prior stroke(s)    The patient has baseline dementia and is not reliable historian    The patient is in acute medical distress and unable to provide information         Objective:   VITALS:    Visit Vitals    /76 (BP 1 Location: Right arm)    Pulse 77    Temp 98.2 °F (36.8 °C)    Resp 14    Ht 5' 2\" (1.575 m)    Wt 46 kg (101 lb 6.6 oz)    SpO2 99%    BMI 18.55 kg/m2       PHYSICAL EXAM:    General:    Alert, cooperative, no distress, appears stated age. HEENT: Atraumatic, anicteric sclerae, pink conjunctivae     No oral ulcers, mucosa dry, o/p clear  Neck:  Supple, symmetrical,  No JVD  Lungs:   Clear to auscultation bilaterally. No Wheezing or Rhonchi. No rales. Chest wall:  No tenderness  No Accessory muscle use. Heart:   Regular  rhythm,  No  murmur   No edema  Abdomen:   Soft, non-tender. Not distended. Bowel sounds normal  Extremities: No cyanosis. No clubbing,      Skin turgor normal, Capillary refill normal  Skin:     Not pale. Not Jaundiced  No rashes   Psych:  Poor insight. Not depressed. Not anxious or agitated. Neurologic: EOMs intact. No facial asymmetry. No aphasia or slurred speech. No focal neurologic deficits.  Alert    _______________________________________________________________________  Care Plan discussed with:    Comments   Patient x    Family  x    RN x    Care Manager Consultant:      _______________________________________________________________________  Expected  Disposition:   Home with Family x   HH/PT/OT/RN    SNF/LTC    ANNELISE    ________________________________________________________________________  TOTAL TIME:  48 Minutes    Critical Care Provided     Minutes non procedure based      Comments    x Reviewed previous records   >50% of visit spent in counseling and coordination of care x Discussion with patient and/or family and questions answered       ________________________________________________________________________  Signed: Yg Cooley MD    Procedures: see electronic medical records for all procedures/Xrays and details which were not copied into this note but were reviewed prior to creation of Plan. LAB DATA REVIEWED:    Recent Results (from the past 24 hour(s))   OCCULT BLOOD, STOOL    Collection Time: 07/04/18 12:21 AM   Result Value Ref Range    Occult blood, stool POSITIVE (A) NEG     CBC WITH AUTOMATED DIFF    Collection Time: 07/04/18 12:31 AM   Result Value Ref Range    WBC 5.2 3.6 - 11.0 K/uL    RBC 3.71 (L) 3.80 - 5.20 M/uL    HGB 9.4 (L) 11.5 - 16.0 g/dL    HCT 31.4 (L) 35.0 - 47.0 %    MCV 84.6 80.0 - 99.0 FL    MCH 25.3 (L) 26.0 - 34.0 PG    MCHC 29.9 (L) 30.0 - 36.5 g/dL    RDW 15.3 (H) 11.5 - 14.5 %    PLATELET 773 990 - 178 K/uL    MPV 10.5 8.9 - 12.9 FL    NRBC 0.0 0  WBC    ABSOLUTE NRBC 0.00 0.00 - 0.01 K/uL    NEUTROPHILS 52 32 - 75 %    LYMPHOCYTES 32 12 - 49 %    MONOCYTES 10 5 - 13 %    EOSINOPHILS 6 0 - 7 %    BASOPHILS 0 0 - 1 %    IMMATURE GRANULOCYTES 0 0.0 - 0.5 %    ABS. NEUTROPHILS 2.7 1.8 - 8.0 K/UL    ABS. LYMPHOCYTES 1.7 0.8 - 3.5 K/UL    ABS. MONOCYTES 0.5 0.0 - 1.0 K/UL    ABS. EOSINOPHILS 0.3 0.0 - 0.4 K/UL    ABS. BASOPHILS 0.0 0.0 - 0.1 K/UL    ABS. IMM.  GRANS. 0.0 0.00 - 0.04 K/UL    DF AUTOMATED     METABOLIC PANEL, COMPREHENSIVE    Collection Time: 07/04/18 12:31 AM   Result Value Ref Range    Sodium 143 136 - 145 mmol/L    Potassium 4.3 3.5 - 5.1 mmol/L    Chloride 111 (H) 97 - 108 mmol/L    CO2 24 21 - 32 mmol/L    Anion gap 8 5 - 15 mmol/L    Glucose 121 (H) 65 - 100 mg/dL    BUN 14 6 - 20 MG/DL    Creatinine 0.96 0.55 - 1.02 MG/DL    BUN/Creatinine ratio 15 12 - 20      GFR est AA >60 >60 ml/min/1.73m2    GFR est non-AA 54 (L) >60 ml/min/1.73m2    Calcium 8.9 8.5 - 10.1 MG/DL    Bilirubin, total 0.5 0.2 - 1.0 MG/DL    ALT (SGPT) 20 12 - 78 U/L    AST (SGOT) 24 15 - 37 U/L    Alk.  phosphatase 91 45 - 117 U/L    Protein, total 7.2 6.4 - 8.2 g/dL    Albumin 3.0 (L) 3.5 - 5.0 g/dL    Globulin 4.2 (H) 2.0 - 4.0 g/dL    A-G Ratio 0.7 (L) 1.1 - 2.2

## 2018-07-04 NOTE — ED PROVIDER NOTES
EMERGENCY DEPARTMENT HISTORY AND PHYSICAL EXAM      Date: 7/3/2018  Patient Name: Franchesca Harrison    History of Presenting Illness     Chief Complaint   Patient presents with    Rectal Bleeding     bright red blood from rectum tonight;  denies abdominal pain       History Provided By: Patient and Patient's Daughter    HPI: Franchesca Harrison, 80 y.o. female with PMHx significant for hypertension, alzheimers, hyperlipidemia, and DM (controlled with diet), colorectal cancer (resection) , presents ambulatory  to the ED with cc of bright red blood in her stool and in her underwear today at 9pm during her bowel movement. Pt's daughter is her caretaker currently and brought pt in due to seeing the blood. Previous episode similar to this one last year, but according to caregiver was not as bad bleeding as today. Caregiver denies any c/o of abdominal pain, sob, chest pain, AMS, anal pain, n/v/d or change in behavior or activity     All other ROS negative at this time  Pt is in no acute distress       There are no other complaints, changes, or physical findings at this time. PCP: Roxanna Riggins MD    Current Outpatient Prescriptions   Medication Sig Dispense Refill    amLODIPine (NORVASC) 10 mg tablet take 1 tablet by mouth once daily 30 Tab 11    losartan (COZAAR) 25 mg tablet take 1 tablet by mouth once daily 30 Tab 11    pravastatin (PRAVACHOL) 20 mg tablet take 1 tablet by mouth once daily 30 Tab 11    losartan (COZAAR) 25 mg tablet Take 2 Tabs by mouth daily.  (Patient taking differently: Take 25 mg by mouth daily.) 30 Tab 11       Past History     Past Medical History:  Past Medical History:   Diagnosis Date    Arrhythmia     Arthritis     Calf pain     when walking    Cancer ([address]     Colon cancer ([address] 1/15/2010    Dementia 3/10/2010    Diabetes mellitus type 2, controlled ([address] 3/9/2016    DM (diabetes mellitus) ([address] 1/15/2010    DM (diabetes mellitus) ([address] 1/15/2010    Diet controlled      Headache(784.0)     Hearing loss     HTN (hypertension) 1/15/2010    Hyperlipidemia 1/15/2010    Leg swelling     MRSA (methicillin resistant staph aureus) culture positive     4/2013    SOB (shortness of breath)        Past Surgical History:  Past Surgical History:   Procedure Laterality Date    ENDOSCOPY, COLON, DIAGNOSTIC      HX CHOLECYSTECTOMY      HX COLECTOMY  2000       Family History:  Family History   Problem Relation Age of Onset    Diabetes Mother     Hypertension Mother     Heart Disease Mother    Terrance Haus Stroke Mother     Diabetes Sister     Heart Disease Sister     Other Sister      alzheimers    Hypertension Sister     Cancer Sister     Hypertension Sister     Hypertension Brother     Heart Disease Brother        Social History:  Social History   Substance Use Topics    Smoking status: Never Smoker    Smokeless tobacco: Never Used    Alcohol use No       Allergies:  No Known Allergies      Review of Systems   Review of Systems   Constitutional: Negative. Negative for activity change, appetite change, chills, diaphoresis, fatigue, fever and unexpected weight change. HENT: Negative. Eyes: Negative. Respiratory: Negative. Negative for shortness of breath. Cardiovascular: Negative. Negative for chest pain and leg swelling. Gastrointestinal: Positive for anal bleeding and blood in stool. Negative for abdominal pain, diarrhea, nausea, rectal pain and vomiting. Endocrine: Negative. Genitourinary: Negative. Negative for dysuria and hematuria. Musculoskeletal: Negative. Negative for back pain and myalgias. Skin: Negative. Negative for rash. Allergic/Immunologic: Negative. Neurological: Negative. Negative for syncope and headaches. Hematological: Negative. Psychiatric/Behavioral: Negative. Negative for confusion. All other systems reviewed and are negative. Physical Exam   Physical Exam   Constitutional: She is oriented to person, place, and time. She appears well-developed and well-nourished. No distress. HENT:   Head: Normocephalic and atraumatic. Right Ear: External ear normal.   Left Ear: External ear normal.   Eyes: Conjunctivae and EOM are normal. Pupils are equal, round, and reactive to light. Neck: Normal range of motion and full passive range of motion without pain. Neck supple. No spinous process tenderness and no muscular tenderness present. Cardiovascular: Normal rate, regular rhythm and normal heart sounds. Pulmonary/Chest: Effort normal and breath sounds normal. No respiratory distress. She has no wheezes. Abdominal: Soft. Bowel sounds are normal. She exhibits no distension. There is no tenderness. There is no rebound, no CVA tenderness, no tenderness at McBurney's point and negative Reed's sign. Genitourinary: Rectal exam shows tenderness and guaiac positive stool. Rectal exam shows no external hemorrhoid, no internal hemorrhoid, no fissure, no mass and anal tone normal.   Neurological: She is alert and oriented to person, place, and time. She has normal reflexes. Skin: Skin is warm and dry. She is not diaphoretic. No pallor. Psychiatric: She has a normal mood and affect. Her behavior is normal. Judgment and thought content normal.   Nursing note and vitals reviewed. Diagnostic Study Results     Labs -   No results found for this or any previous visit (from the past 12 hour(s)). Radiologic Studies -   No orders to display     CT Results  (Last 48 hours)    None        CXR Results  (Last 48 hours)    None            Medical Decision Making   I am the first provider for this patient. I reviewed the vital signs, available nursing notes, past medical history, past surgical history, family history and social history. Vital Signs-Reviewed the patient's vital signs.   Patient Vitals for the past 12 hrs:   Temp Pulse Resp BP SpO2   07/03/18 2252 98.2 °F (36.8 °C) 77 14 123/76 99 %       Pulse Oximetry Analysis - 99% on room air    Cardiac Monitor:   Rate: 77 bpm  Rhythm: Normal Sinus Rhythm 123/76 (92)         ED Course:   Initial assessment performed. The patients presenting problems have been discussed, and they are in agreement with the care plan formulated and outlined with them. I have encouraged them to ask questions as they arise throughout their visit. Procedure Note - Rectal Exam:   11:56 PM  Performed by: Peter Georgetown PA-C  Chaperoned by: Rodolfo Yung M.D  Rectal exam performed. stool was collected. Stool was collected and sent to the lab for Hemoccult testing. Other findings:   The procedure took 1-15 minutes, and pt tolerated well. Disposition:  Admit- discussed plan with Jacques Chacko and will admit pt to hospitalist for low hct and hemoglobin with rectal bleed. 1:13 AM  Spoke with  hospitalist who accepted admission    PLAN:  1. Current Discharge Medication List        2. Follow-up Information     None        Return to ED if worse     Diagnosis     Clinical Impression: No diagnosis found.

## 2018-07-04 NOTE — ED NOTES
Patient assisted up to bedside commode to void, small amount of bright red blood from rectum. Patient cleaned and positioned for comfort.

## 2018-07-04 NOTE — PROGRESS NOTES
Problem: Falls - Risk of  Goal: *Absence of Falls  Document [place] Fall Risk and appropriate interventions in the flowsheet.    Fall Risk Interventions:  Mobility Interventions: OT consult for ADLs, Patient to call before getting OOB, PT Consult for mobility concerns, PT Consult for assist device competence    Mentation Interventions: Door open when patient unattended, Family/sitter at bedside    Medication Interventions: Patient to call before getting OOB    Elimination Interventions: Call light in reach, Patient to call for help with toileting needs             Problem: Patient Education: Go to Patient Education Activity  Goal: Patient/Family Education  Outcome: Progressing Towards Goal  Call bell within reach, [id number], bed alarm on, family at bedside

## 2018-07-04 NOTE — PROGRESS NOTES
Hospitalist Progress Note    NAME: Génesis Blakely   :  1922   MRN:  [id number]       Assessment / Plan:  Rectal bleeding with BRB   -admitted earlier today by my partner   - Hb admission 9.4 --> 8.9   -GI help appreciated  -bleeding scan in progress but pt is very combative, so may not be able to complete.   -Monitor closely and transfuse as needed  -agree with conservative / watchful management in this frail pt   Addendum: unable to proceed with bleeding scan due to agitation. Pt pulled Iv out. CTA ordered by GI instead. D/w dtr at bedside. Family thinking about taking pt home. I agree that due to advanced dementia/agitation pt will be benefiting from returning home to he usual environment asap.      Hypertension  -BP 120s   -cont to hold home losartan and norvasc while monitoring for bleed     Hyperlipidemia, hold statin at this time  Dementia  History of Colon Cancer s/p resection 15 years ago         Code Status: DNR  Surrogate Decision Maker: Daughter, Marah Vaughan, is surrogate decision maker  DVT Prophylaxis: SCDs     Baseline: lives with daughter, able to ambulate without assistance  Recommended Disposition: Home w/Family     Subjective:     Chief Complaint / Reason for Physician Visit: following bleeding / dementia   No blood in stool since admission     Discussed with RN events overnight. Review of Systems:  Symptom Y/N Comments  Symptom Y/N Comments   Fever/Chills    Chest Pain     Poor Appetite    Edema     Cough    Abdominal Pain     Sputum    Joint Pain     SOB/DALTON    Pruritis/Rash     Nausea/vomit    Tolerating PT/OT     Diarrhea    Tolerating Diet     Constipation    Other       Could NOT obtain due to: Dementia      Objective:     VITALS:   Last 24hrs VS reviewed since prior progress note.  Most recent are:  Patient Vitals for the past 24 hrs:   Temp Pulse Resp BP SpO2   18 1119 97.6 °F (36.4 °C) (!) 55 18 129/70 96 %   18 0758 97.5 °F (36.4 °C) (!) 58 18 (!) 132/93 99 % 07/04/18 0431 - 61 17 - 97 %   07/04/18 0415 - - - (!) 147/97 97 %   07/04/18 0408 - - - 132/76 96 %   07/03/18 2252 98.2 °F (36.8 °C) 77 14 123/76 99 %     No intake or output data in the 24 hours ending 07/04/18 1355     PHYSICAL EXAM:  General: WD, WN. Alert, cooperative, no acute distress    EENT:  EOMI. Anicteric sclerae. MMM  Resp:  CTA bilaterally, no wheezing or rales. No accessory muscle use  CV:  Regular  rhythm,  No edema  GI:  Soft, Non distended, Non tender.  +Bowel sounds  Neurologic:  Alert and oriented X 1, normal speech,   Psych:   Poor insight. Not anxious nor agitated  Skin:  No rashes. No jaundice    Reviewed most current lab test results and cultures  YES  Reviewed most current radiology test results   YES  Review and summation of old records today    NO  Reviewed patient's current orders and MAR    YES  PMH/SH reviewed - no change compared to H&P  ________________________________________________________________________  Care Plan discussed with:    Comments   Patient y    Family  y dtr bedside    RN y    Care Manager     Consultant                        Multidiciplinary team rounds were held today with , nursing, pharmacist and clinical coordinator. Patient's plan of care was discussed; medications were reviewed and discharge planning was addressed. ________________________________________________________________________  Total NON critical care TIME: 20  Minutes    Total CRITICAL CARE TIME Spent:   Minutes non procedure based      Comments   >50% of visit spent in counseling and coordination of care     ________________________________________________________________________  Shaquille Frank MD     Procedures: see electronic medical records for all procedures/Xrays and details which were not copied into this note but were reviewed prior to creation of Plan. LABS:  I reviewed today's most current labs and imaging studies.   Pertinent labs include:  Recent Labs 07/04/18   0446  07/04/18 0031   WBC   --   5.2   HGB  8.9*  9.4*   HCT  30.3*  31.4*   PLT   --   234     Recent Labs      07/04/18   0343  07/04/18 0031   NA   --   143   K   --   4.3   CL   --   111*   CO2   --   24   GLU   --   121*   BUN   --   14   CREA   --   0.96   CA   --   8.9   ALB   --   3.0*   TBILI   --   0.5   SGOT   --   24   ALT   --   20   INR  1.0   --        Signed: Destiney Lynn MD

## 2018-07-04 NOTE — DISCHARGE INSTRUCTIONS
Patient Discharge Instructions    Constantin Ya / [id number] : 1922    Admitted 7/3/2018 Discharged: 2018         DISCHARGE DIAGNOSIS:       Bleeding - avoid constipation , stay well hydrated     Hold blood pressure medications for 2 days. If blood pressure remain above 120 ok to start it back. Take Home Medications       General drug facts     If you have a very bad allergy, wear an allergy ID at all times. It is important that you take the medication exactly as they are prescribed. Keep your medication in the bottles provided by the pharmacist.  Keep a list of all your drugs (prescription, natural products, vitamins, OTC) with you. Give this list to your doctor. Do not take other medications without consulting your doctor. Do not share your drugs with others and do not take anyone else's drugs. Keep all drugs out of the reach of children and pets. Most drugs may be thrown away in household trash after mixing with coffee grounds or abhishek litter and sealing in a plastic bag. Keep a list Call your doctor for help with any side effects. If in the [place] you may also call the FDA at 1-349-DLA-2972    Talk with the doctor before starting any new drug, including OTC, natural products, or vitamins. What to do at Home    1. Recommended diet:regular     2. Recommended activity: Activity as tolerated    3. If you experience any of the following symptoms then please call your primary care physician or return to the emergency room if you cannot get hold of your doctor: weakness/dizziness/fall / extensive bleeding     4. Lab work: follow blood counts in 3 days with PCP    5. Bring these papers with you to your follow up appointments.  The papers will help your doctors be sure to continue the care plan from the hospital.      Follow-up with:   PCP: Ligia Ballard MD  Follow-up Information     Follow up With Details Comments [address] Javy Denny MD In 3 days  [address] [facility]  [phone]             Please call for your own appointment        Information obtained by :  I understand that if any problems occur once I am at home I am to contact my physician. I understand and acknowledge receipt of the instructions indicated above.                                                                                                                                            Physician's or R.N.'s Signature                                                                  Date/Time                                                                                                                                              Patient or Representative Signature                                                          Date/Time

## 2018-07-04 NOTE — PROGRESS NOTES
After administering radioactive tracer and positioning pt for GI bleed scan, pt became very combative and agitated. Unable to control or calm down pt. Pt tried many times to get out of bed and thrashing. Pt pulled IV out. Spoke to her nurse Agueda Ravi and made her aware of situation. Spoke to Dr. Dwight Saleem and Dr. Radha Garcia about pt's condition. Both are in agreement with termination of the study at this time.

## 2018-07-04 NOTE — PROGRESS NOTES
General Surgery End of Shift Nursing Note    Bedside shift change report given to Lexus Jarquin (oncoming nurse) by Jakci Yoder (offgoing nurse). Report included the following information SBAR, Kardex, Intake/Output, MAR and Recent Results. Shift worked:   D   Summary of shift:    0   Issues for physician to address:   0     Number times ambulated in hallway past shift: 0    Number of times OOB to chair past shift: 0    Pain Management:  Current medication: 0  Patient states pain is manageable on current pain medication: YES    GI:    Current diet:  DIET REGULAR 2 GM NA (House Low NA)    Tolerating current diet: YES  Passing flatus: YES  Last Bowel Movement: today   Appearance: 0    Respiratory:    Incentive Spirometer at bedside: YES  Patient instructed on use: YES    Patient Safety:    Falls Score: 1  Bed Alarm On? Yes  Sitter?  No    Chriss Martin RN

## 2018-07-04 NOTE — CONSULTS
[address]     Marcellus Foss  MR#: [id number]  : 1922  ACCOUNT #: [de-identified]   DATE OF SERVICE: 2018    REQUESTING PHYSICIAN:  Shaquille Frank MD.    PRIMARY CARE PHYSICIAN:  Altagracia Antonio MD    REASON FOR CONSULTATION:  Rectal bleeding. PRIMARY GASTROENTEROLOGIST:  Alanna El MD     HISTORY OF PRESENT ILLNESS:  This is a delightful 70-year-old lady with Alzheimer's dementia who presents to the ER with bright red blood per rectum that started late last night. The patient is unable to give any history because of her dementia. She is here with her daughter. She has  a baseline hemoglobin of around mid-10. On admission yesterday, hemoglobin was 9.4 and currently it is 8.9. Throughout this admission, she was not hypotensive or tachycardic. There is a reported history of colonic cancer resection 15 years or so ago. A previous similar GI bleed episode was treated medically in [address] when she was seen by Dr. Alanna El where she recovered without any GI or invasive intervention. We were asked to see her for this GI bleed. Patient's code status is DNR. PAST MEDICAL HISTORY:  Arrhythmias, arthritis, calf pain, previous history of colon cancer, dementia, type 2 diabetes, headache, hearing loss, hypertension, hyperlipidemia, and worsening shortness of breath. PAST SURGICAL HISTORY:  Reported colonoscopy was performed a while ago. Results are unavailable to me. She has history of colectomy in , also had cholecystectomy. SOCIAL HISTORY:  No smoking, alcohol, or drug use. FAMILY HISTORY:  Positive for diabetes, hypertension, coronary artery disease, CVA, and Alzheimer's. Sister also had \"cancer. \"    ALLERGIES:  NONE NOTED. HOME MEDICATIONS:  Include Norvasc, Cozaar, and Pravachol. REVIEW OF SYSTEMS:  A detailed review of systems cannot be obtained as the patient has baseline dementia.     PHYSICAL EXAMINATION:  VITAL SIGNS:  Her temperature is 97.5, pulse 58, blood pressure is 132/93, sats are 99%, respiratory rate is 18. GENERAL:  She is awake, pleasant, has dementia, cannot give a detailed history. HEENT:  Extraocular muscles intact. NECK:  Supple. LUNGS:  Clear. HEART:  Regular rate and rhythm. ABDOMEN:  Soft, nontender, and is otherwise soft. EXTREMITIES:  Without edema. NEUROLOGIC:  Poor insight into her medical condition, and neurologically she is awake as noted. LABORATORY DATA:  White cell is 5.2, H and H 8.3 and 30.3, platelets of 931. Chem-7:  BUN 14, creatinine 0.9, otherwise normal.  Stool guaiac test was positive. No recent radiological imaging is available for review. Last CT scan of the head was in 2009 which showed old ischemic changes and atrophy. ASSESSMENT AND PLAN:    A 72-year-old lady who presents with bright red blood per rectum with mild anemia, history of hypertension, hyperlipidemia, and dementia. Previous history of colon cancer over 15 years ago. RECOMMENDATIONS:  Her daughter, Megan Hui, who is at the bedside is her power of . I spoke with her in detail about how she wants us to pursue the workup on her mother for this bleed. Options include:    -Observation and seeing what happens.    -Radiological testing with either a nuclear medicine bleeding scan or a CT scan to make sure that there is no active bleeding, and if there is a bleed, to pursue it further or prepping her for a colonoscopy for tomorrow. The POA does not want a colonoscopic evaluation on her mother. She would rather take her mother home but wants us to 'make sure' that she is not actively bleeding. For that matter, I suggest a nuclear medicine bleeding scan today. If this is negative, I suggest observation with serial hgb checks and starting her on a diet, as she tolerates. If hemoglobin is stable, hopefully we can discharge her tomorrow.     If on the other hand, the bleeding scan is positive, an angiographic embolization can be entertained. I am hoping that the bleeding has stopped spontaneously as it did in 2017. Thank you for allowing me to see this lady. SILVIA DIAZ [facility] /   D: 07/04/2018 11:03     T: 07/04/2018 11:36  JOB #: [id number]  CC: Dipti Brush MD  CC: Sanjuana Carver MD

## 2018-07-04 NOTE — DISCHARGE SUMMARY
Hospitalist Discharge Summary     Patient ID:  Joshua Espana  [id number]  82 y.o.  8/2/1922    PCP on record: Agapito Lilly MD    Admit date: 7/3/2018  Discharge date and time: 7/4/2018      DISCHARGE DIAGNOSIS:    Rectal bleeding with BRB   Acute blood loss anemia   Hypertension  Hyperlipidemia  Dementia  History of Colon Cancer s/p resection 15 years ago  Code Status: DNR    CONSULTATIONS:  IP CONSULT TO GASTROENTEROLOGY    Excerpted HPI from H&P of Royer Harrison MD:    Joshua Espana is a 80 y.o.  female who presents with BRBPR around 9 pm on 7/3/18. The majority of my history was obtained from patient's daughter, Saloni Pfeiffer, as patient has dementia and is not a good historian. Patient normally has loose brown stools. She woke up yesterday evening and agreed that she needed to use the restroom when prompted by her daughter. In the restroom patient had a sizeable amount of bright red blood in the toilet. Patient did not complain of abdominal pain. She did not appear SOB or diaphoretic. She did not complain of or look lightheaded. Patient denies any complaints this evening.      Of historic note patient had a similar issue with this about 1 year ago and was monitored and discharged home without intervention as her symptoms resolved and Hgb remained essentially stable.      We were asked to admit for work up and evaluation of the above problems.        ______________________________________________________________________  DISCHARGE SUMMARY/HOSPITAL COURSE:  for full details see H&P, daily progress notes, labs, consult notes. Rectal bleeding with BRB   -admitted earlier today by my partner . No BM while in the hospital   - Hb admission 9.4 --> 8.9   -GI help appreciated: pt declined colonoscopy   - Pt is not cooperative with blood drawing or imaging. She pulled IV out and was very combative with RN when they try to replace it.  D/w dtr at bedside in details and in agreement: due to advanced dementia/agitation pt will be benefiting from returning home to her usual environment asap. Family will bring her back in case of massive recurrent bleeding.   -follow with PCP in 3 days       Hypertension  -BP 120s   -hold home losartan and norvasc x 2 days, restart 7/7 if BP stable      Hyperlipidemia, cont statin   Dementia  History of Colon Cancer s/p resection 15 years ago            Code Status: DNR  Surrogate Decision Maker: Daughter, Ulysses Rhodes, is surrogate decision maker  DVT Prophylaxis: SCDs      Baseline: lives with daughter, able to ambulate without assistance  Recommended Disposition: Home w/Family        _______________________________________________________________________  Patient seen and examined by me on discharge day.     PHYSICAL EXAM:  General:                    WD, WN. Alert, cooperative, no acute distress    EENT:                                  EOMI. Anicteric sclerae. MMM  Resp:                                   CTA bilaterally, no wheezing or rales. No accessory muscle use  CV:                                      Regular  rhythm,  No edema  GI:                                       Soft, Non distended, Non tender.  +Bowel sounds  Neurologic:                Alert and oriented X 1, normal speech,   Psych:                       Poor insight. Not anxious nor agitated  Skin:                                    No rashes. No jaundice  _______________________________________________________________________  DISCHARGE MEDICATIONS:   Current Discharge Medication List      CONTINUE these medications which have CHANGED    Details   amLODIPine (NORVASC) 10 mg tablet Take 1 Tab by mouth daily. Start taking 7/7 if blood pressure remain above 120  Qty: 30 Tab, Refills: 11    Associated Diagnoses: Essential hypertension      losartan (COZAAR) 25 mg tablet Take 1 Tab by mouth daily.  Start taking 7/7 if blood pressure remain above 120  Qty: 30 Tab, Refills: 11 CONTINUE these medications which have NOT CHANGED    Details   pravastatin (PRAVACHOL) 20 mg tablet take 1 tablet by mouth once daily  Qty: 30 Tab, Refills: 11             My Recommended Diet, Activity, Wound Care, and follow-up labs are listed in the patient's Discharge Insturctions which I have personally completed and reviewed.     ______________________________________________________________________    Risk of deterioration: Moderate    Condition at Discharge:  Stable  ______________________________________________________________________    Disposition  Home with family, no needs  ______________________________________________________________________    Care Plan discussed with:   Patient, Family, RN, Consultant    ______________________________________________________________________    Code Status: DNR/DNI  ______________________________________________________________________      Follow up with:   PCP : Faina Soria MD  Follow-up Information     Follow up With Details Comments [address] Javy Denny MD In 3 days  [address]  [phone]                Total time in minutes spent coordinating this discharge (includes going over instructions, follow-up, prescriptions, and preparing report for sign off to her PCP) :  > 30 minutes    Signed:  Katie Henderson MD

## 2018-07-04 NOTE — INTERDISCIPLINARY ROUNDS
Interdisciplinary Rounds were completed on this patient. Rounds included nursing, clinical care leader, pharmacy, and case management. Plan for the day: safety, d/c planning??, bleed scan today, GI following   Plan for the stay: continue current TX  Activity: TURN Q2  Anticipated discharge date: Possible home today--awaiting bleed scan?

## 2018-07-05 NOTE — PROGRESS NOTES
Hospital Discharge Follow-Up      Date/Time:  7/10/2018 2:47 PM    Patient was admitted to [facility] on 7/3/18 and discharged on 7/4/18 for Rectal bleeding with BRB, Acute blood loss anemia . The physician discharge summary was available at the time of outreach. Patient was contacted within 2 business days of discharge. Message left for dtr. Top Challenges reviewed with the provider   - Pt in hosp less than 24 hrs. Became combative due to advanced dementia/agitation, pulled out IV    - GI consult done. No work-up due to above. No BM while in ED    - +Stool Occult Blood. HGB 9.4, 8.9 (prior HB 9/15/17 11.9)    - D/C'd home to f/u w/ PCP in 3 days. Method of communication with provider :chart routing    Inpatient RRAT score: 32  Was this a readmission? no   Patient stated reason for the readmission: n/a    Nurse Navigator (NN) attempted to contact pt & ECs. Message left for dtr. Disease Specific:   N/A    Summary of patient's top problems:  1. Rectal bleeding with BRB- Hgb admission 9.4 --> 8.9. GI consult- pt declined  colonoscopy. Pt is not cooperative with blood drawing or imaging. Pulled IV out, very combative with RN when tried to replace it. D/w dtr at bedside in details and in agreement: due to advanced dementia/agitation pt will benefit from returning home to her usual environment asap. Family will bring her back in case of massive recurrent bleeding. Follow with PCP in 3 days   2. Hypertension- BP 120s. Hold home Losartan and Norvasc x 2 days, restart 7/7 if BP stable  3. ACP- none on file. Per Hospitalist documentation pt a \"DNR\". Home Health orders at discharge: None  [address]: n/a  Date of initial visit: n/a    Durable Medical Equipment ordered/company: none  Durable Medical Equipment received: n/a    Barriers to care?  TBD    Advance Care Planning:   Does patient have an Advance Directive:  not on file     Medication(s):     New Medications at Discharge: none  Changed Medications at Discharge: amLODIPine (NORVASC) 10 mg tablet, losartan (COZAAR) 25 mg tablet (Start taking both on 7/7 if blood pressure remains above 120). Discontinued Medications at Discharge: none    Medication reconciliation not done. Unable to contact pt/family. Referral to Pharm D needed: no     Current Outpatient Prescriptions   Medication Sig    amLODIPine (NORVASC) 10 mg tablet Take 1 Tab by mouth daily. Start taking 7/7 if blood pressure remain above 120    losartan (COZAAR) 25 mg tablet Take 1 Tab by mouth daily. Start taking 7/7 if blood pressure remain above 120    pravastatin (PRAVACHOL) 20 mg tablet take 1 tablet by mouth once daily     No current facility-administered medications for this visit. There are no discontinued medications. PCP/Specialist follow up: No future appointments. Community Provider Appointment: none    Goals      Transitions of Care- collaboration & care coordination to prevent complications post hospitalization. 7/10/18- no return call from pt's dtr. Per EMR review pt was No Show for PCP DIRK f/u today @ 11 AM. Plan- attempt NN f/u in 1-2 days-ID    7/6/18- message from Practice Manager informing NN PCP DIRK f/u appt scheduled for 7/10/18-ID.    7/5/18- attempted to contact pt & listed ECs. Message left for dtr Una Hartmann-ID.

## 2018-07-19 NOTE — PROGRESS NOTES
HPI  Ms. Joshua Espana is a 80y.o. year old female, she is seen today for TCM visit. Admitted 7/3-7/4 to [facility] for rectal bleeding. First contact made by Carmenza Escoto RN within 2 business days of discharge. Accompanied by her daughter who provides history. Hospital records reviewed. Normally patient has soft brown stools. Prior to admission had bright red blood in toilet with BM without any other symptoms including n/v/abd pain, sob or diaphoresis. Had similar spell about a year ago, resolved on its own and no transfusion or intervention needed. Hemoglobin was 9.4 on admission down to 8.9 on discharge. No interventions during admission as patient with dementia and was combative, agitated including pulling out IV and refusing to have replaced. In usual stat of health today. No cough, f/c, n/v/abd pain. Had one mild spell of rectal bleeding as noted in toilet last week after she was home but none since. No constipation, no straining, no fatigue, appetite good. No cough, sob. Chief Complaint   Patient presents with   [facility] Follow Up     Room 2A// MRM 7/3-7/4 r/t rectal bleeding     Annual Wellness Visit        Prior to Admission medications    Medication Sig Start Date End Date Taking? Authorizing Provider   amLODIPine (NORVASC) 10 mg tablet Take 1 Tab by mouth daily. Start taking 7/7 if blood pressure remain above 120 7/4/18  Yes Lin Estrella MD   losartan (COZAAR) 25 mg tablet Take 1 Tab by mouth daily.  Start taking 7/7 if blood pressure remain above 120 7/4/18  Yes Lin Estrella MD   pravastatin (PRAVACHOL) 20 mg tablet take 1 tablet by mouth once daily 12/7/17  Yes Agapito Lilly MD         No Known Allergies      REVIEW OF SYSTEMS:  Per HPI    PHYSICAL EXAM:  Visit Vitals    /70 (BP 1 Location: Left arm, BP Patient Position: Sitting)    Pulse 83    Temp 97.8 °F (36.6 °C) (Oral)    Resp 16    Ht 5' 2\" (1.575 m)    Wt 103 lb (46.7 kg)    BMI 18.84 kg/m2 Constitutional: Appears well-developed and well-nourished. No distress. HENT:   Head: Normocephalic and atraumatic. Eyes: No scleral icterus. Neck: no lad, no tm, supple   Cardiovascular: Normal S1/S2, regular rhythm. 2/6 systolic murmur without rubs, or gallops. Pulmonary/Chest: Effort normal and breath sounds normal. No respiratory distress. No wheezes, rhonchi, or rales. Abdomen: Soft, NT/ND, +BS, no rebound or guarding. Back: no pain on palpation, no CVA tenderness  Ext: No edema. Neurological: Alert. Psychiatric: Normal mood and affect. Behavior is normal.     Lab Results   Component Value Date/Time    Sodium 143 07/04/2018 12:31 AM    Potassium 4.3 07/04/2018 12:31 AM    Chloride 111 (H) 07/04/2018 12:31 AM    CO2 24 07/04/2018 12:31 AM    Anion gap 8 07/04/2018 12:31 AM    Glucose 121 (H) 07/04/2018 12:31 AM    BUN 14 07/04/2018 12:31 AM    Creatinine 0.96 07/04/2018 12:31 AM    BUN/Creatinine ratio 15 07/04/2018 12:31 AM    GFR est AA >60 07/04/2018 12:31 AM    GFR est non-AA 54 (L) 07/04/2018 12:31 AM    Calcium 8.9 07/04/2018 12:31 AM    Bilirubin, total 0.5 07/04/2018 12:31 AM    AST (SGOT) 24 07/04/2018 12:31 AM    Alk.  phosphatase 91 07/04/2018 12:31 AM    Protein, total 7.2 07/04/2018 12:31 AM    Albumin 3.0 (L) 07/04/2018 12:31 AM    Globulin 4.2 (H) 07/04/2018 12:31 AM    A-G Ratio 0.7 (L) 07/04/2018 12:31 AM    ALT (SGPT) 20 07/04/2018 12:31 AM     Lab Results   Component Value Date/Time    Hemoglobin A1c 5.3 07/11/2017 11:59 AM    Hemoglobin A1c 5.8 (H) 01/18/2017 01:49 PM    Hemoglobin A1c 5.7 (H) 03/09/2016 03:38 PM      Lab Results   Component Value Date/Time    Cholesterol, total 184 01/18/2017 01:49 PM    HDL Cholesterol 33 (L) 01/18/2017 01:49 PM    LDL, calculated 107 (H) 01/18/2017 01:49 PM    VLDL, calculated 44 (H) 01/18/2017 01:49 PM    Triglyceride 219 (H) 01/18/2017 01:49 PM    CHOL/HDL Ratio 7.0 (H) 06/24/2009 11:50 AM          ASSESSMENT/PLAN  Diagnoses and all orders for this visit:    1. BRBPR (bright red blood per rectum)    2. Essential hypertension    3. Anemia, unspecified type  -     Ferrous Sulfate (SLOW FE) 47.5 mg iron TbER tablet; Take 1 Tab by mouth daily. 4. Dementia without behavioral disturbance, unspecified dementia type    Will add iron as above. In past has caused constipation so daughter will watch for this and could use stool softener, miralax as needed. bp at goal. Suspect internal hemorrhoid v. Fissure as source of bleeding but given age, co-morbidities agree with conservative management. Health Maintenance Due   Topic Date Due    DTaP/Tdap/Td series (1 - Tdap) [birth year]/1943    GLAUCOMA SCREENING Q2Y  08/02/1987    MEDICARE YEARLY EXAM  07/12/2018        Follow-up Disposition:  Return in about 4 months (around 11/19/2018) for bp. Reviewed plan of care. Patient has provided input and agrees with goals. The nurse provided the patient and/or family with advanced directive information if needed and encouraged the patient to provide a copy to the office when available.

## 2018-07-19 NOTE — PROGRESS NOTES
Thomas Bridges  Identified pt with two pt identifiers(name and ). Chief Complaint   Patient presents with   [facility] Follow Up     Room 2A// MRM 7/3- r/t rectal bleeding        1. Have you been to the ER, urgent care clinic since your last visit? Hospitalized since your last visit? Yes, see encounter notes     2. Have you seen or consulted any other health care providers outside of the [facility] since your last visit? Include any pap smears or colon screening. NO      Provider notified of reason for visit, vitals and flowsheets obtained on patients.      Patient received paperwork for advance directive during previous visit but has not completed at this time     Reviewed record In preparation for visit, huddled with provider and have obtained necessary documentation      Health Maintenance Due   Topic    DTaP/Tdap/Td series (1 - Tdap)    GLAUCOMA SCREENING Q2Y     MEDICARE YEARLY EXAM        Wt Readings from Last 3 Encounters:   18 103 lb (46.7 kg)   18 101 lb 6.6 oz (46 kg)   10/18/17 103 lb (46.7 kg)     Temp Readings from Last 3 Encounters:   18 97.8 °F (36.6 °C) (Oral)   18 97.6 °F (36.4 °C)   10/18/17 97.9 °F (36.6 °C) (Oral)     BP Readings from Last 3 Encounters:   18 124/70   18 129/70   10/18/17 110/60     Pulse Readings from Last 3 Encounters:   18 83   18 (!) 55   10/18/17 77     Vitals:    18 1549   BP: 124/70   Pulse: 83   Resp: 16   Temp: 97.8 °F (36.6 °C)   TempSrc: Oral   Weight: 103 lb (46.7 kg)   Height: 5' 2\" (1.575 m)   PainSc:   0 - No pain         Learning Assessment:  :     Learning Assessment 2014   PRIMARY LEARNER Child(del)   HIGHEST LEVEL OF EDUCATION - PRIMARY LEARNER  2 YEARS OF COLLEGE   BARRIERS PRIMARY LEARNER NONE   PRIMARY LANGUAGE ENGLISH    NEED No   LEARNER PREFERENCE PRIMARY DEMONSTRATION   ANSWERED BY child   RELATIONSHIP OTHER       Depression Screening:  :     PHQ over the last two weeks 7/5/2017   Little interest or pleasure in doing things Not at all   Feeling down, depressed, irritable, or hopeless Not at all   Total Score PHQ 2 0       Fall Risk Assessment:  :     Fall Risk Assessment, last 12 mths 7/5/2017   Able to walk? Yes   Fall in past 12 months? Yes   Fall with injury? No   Number of falls in past 12 months 1   Fall Risk Score 1       Abuse Screening:  :     Abuse Screening Questionnaire 1/18/2017 9/2/2014   Do you ever feel afraid of your partner? N N   Are you in a relationship with someone who physically or mentally threatens you? N N   Is it safe for you to go home? Y Y       ADL Screening:  :     ADL Assessment 9/9/2015   Feeding yourself No Help Needed   Getting from bed to chair No Help Needed   Getting dressed No Help Needed   Bathing or showering No Help Needed   Walk across the room (includes cane/walker) No Help Needed   Using the telphone No Help Needed   Taking your medications Help Needed   Preparing meals Help Needed   Managing money (expenses/bills) Help Needed   Moderately strenuous housework (laundry) Help Needed   Shopping for personal items (toiletries/medicines) Help Needed   Shopping for groceries Help Needed   Driving Help Needed   Climbing a flight of stairs Help Needed   Getting to places beyond walking distances Help Needed         Medication reconciliation up to date and corrected with patient at this time.

## 2018-07-19 NOTE — MR AVS SNAPSHOT
[address] [address] [phone] Patient: Demetrio Schlatter MRN: [id number] KPY:[id number] Visit Information Date & Time Provider Department Dept. Phone Encounter #  
 7/19/2018  3:45 PM Marisol White MD [facility] Internal Medicine [facility] [id number] Follow-up Instructions Return in about 4 months (around 11/19/2018) for bp. Upcoming Health Maintenance Date Due DTaP/Tdap/Td series (1 - Tdap) 8[birth year]2/1943 GLAUCOMA SCREENING Q2Y 8/2/1987 MEDICARE YEARLY EXAM 7/12/2018 Influenza Age 5 to Adult 8/1/2018 Allergies as of 7/19/2018  Review Complete On: 7/19/2018 By: Marisol White MD  
 No Known Allergies Current Immunizations  Reviewed on 10/18/2017 Name Date Influenza High Dose Vaccine PF 10/18/2017, 1/18/2017 Influenza Vaccine Gracie Goody) 9/9/2015 Influenza Vaccine Split 10/31/2012 10:17 AM  
 Pneumococcal Conjugate (PCV-13) 3/9/2016 Not reviewed this visit You Were Diagnosed With   
  
 Codes Comments BRBPR (bright red blood per rectum)    -  Primary ICD-10-CM: K62.5 ICD-9-CM: 569.3 Essential hypertension     ICD-10-CM: I10 
ICD-9-CM: 401.9 Anemia, unspecified type     ICD-10-CM: D64.9 ICD-9-CM: 285.9 Dementia without behavioral disturbance, unspecified dementia type     ICD-10-CM: F03.90 ICD-9-CM: 294.20 Vitals BP Pulse Temp Resp Height(growth percentile) Weight(growth percentile) 124/70 (BP 1 Location: Left arm, BP Patient Position: Sitting) 83 97.8 °F (36.6 °C) (Oral) 16 5' 2\" (1.575 m) 103 lb (46.7 kg) SpO2 BMI OB Status Smoking Status 93% 18.84 kg/m2 Postmenopausal Never Smoker Vitals History BMI and BSA Data Body Mass Index Body Surface Area  
 18.84 kg/m 2 1.43 m 2 Preferred Pharmacy Pharmacy Name Phone [phone], [address] [phone] Your Updated Medication List  
  
   
This list is accurate as of 7/19/18  4:16 PM.  Always use your most recent med list. amLODIPine 10 mg tablet Commonly known as:  Katerin Colon Take 1 Tab by mouth daily. Start taking 7/7 if blood pressure remain above 120 Ferrous Sulfate 47.5 mg iron Tber tablet Commonly known as:  SLOW FE Take 1 Tab by mouth daily. losartan 25 mg tablet Commonly known as:  COZAAR Take 1 Tab by mouth daily. Start taking 7/7 if blood pressure remain above 120  
  
 pravastatin 20 mg tablet Commonly known as:  PRAVACHOL  
take 1 tablet by mouth once daily Prescriptions Sent to Pharmacy Refills Ferrous Sulfate (SLOW FE) 47.5 mg iron TbER tablet 1 Sig: Take 1 Tab by mouth daily. Class: Normal  
 Pharmacy: [address] Ph #: [phone] Route: Oral  
  
Follow-up Instructions Return in about 4 months (around 11/19/2018) for bp. Introducing [facility]! [employer] introduces ice patient portal. Now you can access parts of your medical record, email your doctor's office, and request medication refills online. 1. In your internet browser, go to [email or web address] 2. Click on the First Time User? Click Here link in the Sign In box. You will see the New Member Sign Up page. 3. Enter your ice Access Code exactly as it appears below. You will not need to use this code after youve completed the sign-up process. If you do not sign up before the expiration date, you must request a new code. · ice Access Code: [email or web address] Expires: 10/2/2018  3:14 PM 
 
4. Enter the last four digits of your Social Security Number (xxxx) and Date of Birth (mm/dd/yyyy) as indicated and click Submit. You will be taken to the next sign-up page. 5. Create a ice ID.  This will be your ice login ID and cannot be changed, so think of one that is secure and easy to remember. 6. Create a RUN password. You can change your password at any time. 7. Enter your Password Reset Question and Answer. This can be used at a later time if you forget your password. 8. Enter your e-mail address. You will receive e-mail notification when new information is available in [address] 9. Click Sign Up. You can now view and download portions of your medical record. 10. Click the Download Summary menu link to download a portable copy of your medical information. If you have questions, please visit the Frequently Asked Questions section of the RUN website. Remember, RUN is NOT to be used for urgent needs. For medical emergencies, dial 911. Now available from your iPhone and Android! Please provide this summary of care documentation to your next provider. Your primary care clinician is listed as Andreina Figueroa. If you have any questions after today's visit, please call [phone].

## 2018-08-03 NOTE — PROGRESS NOTES
Patient has graduated from the Transitions of Care Coordination  program on 8/3/18. Patient's symptoms are stable at this time. Patient/family has the ability to self-manage. Care management goals have been completed at this time. No further nurse navigator follow up scheduled. Goals Addressed  COMPLETED: Transitions of Care- collaboration & care coordination to prevent complications post hospitalization. 8/3/18- appt was rescheduled for 7/19/18. Pt attended appt. Spoke w/ dtr Susan Mccray. No further episodes of rectal bleeding since PCP f/u. Doing better. Taking Iron. +BMs. No readmissions in 30 day DIRK period. Plan- resolve NN DIRK Episode-ID. 7/10/18- no return call from pt's dtr. Per EMR review pt was No Show for PCP DIRK f/u today @ 11 AM. Plan- attempt NN f/u in 1-2 days-ID 
 
7/6/18- message from Practice Manager informing NN PCP DIRK f/u appt scheduled for 7/10/18-ID. 7/5/18- attempted to contact pt & listed ECs. Message left for dtr Una Hartmann-ID. Pt has nurse navigator's contact information for any further questions, concerns, or needs. Patients upcoming visits:  Future Appointments Date Time Provider Mendez Castaneda 11/14/2018 2:30 PM Ovi Rankin  W[facility]

## 2018-12-03 NOTE — TELEPHONE ENCOUNTER
Daughter states heard there's been a recall on Amlodipine and would like an alternative called in for pt

## 2018-12-04 NOTE — ED PROVIDER NOTES
EMERGENCY DEPARTMENT HISTORY AND PHYSICAL EXAM 
     
 
Date: 12/4/2018 Patient Name: Naveed Grady History of Presenting Illness No chief complaint on file. History Provided By: EMS 
 
HPI: Naveed Grady is a 80 y.o. female, pmhx HTN / DM / colon CA / Christal Sprawls / dementia, who presents via EMS to the ED with CPR in progress secondary to cardiopulmonary arrest PTA. EMS states they were initially dispatched at [address] [address] to the house for an unresponsive pt with a pulse. Pt sinus bradycardic with palpable pulses and  on their arrival. Pt subsequently lost pulses with agonal respirations; intubated in the field and given 1 amp Epinephrine at 0120. EMS reports daughter was last seen normal by daughter at 5 this evening. Hx otherwise limited at this time. PCP: Palak Roth MD 
 
Allergies: NKDA Social Hx: -tobacco, -EtOH, -Illicit Drugs HX LIMITED SECONDARY TO ACUITY OF CONDITION. Current Outpatient Medications Medication Sig Dispense Refill  Ferrous Sulfate (SLOW FE) 47.5 mg iron TbER tablet Take 1 Tab by mouth daily. 30 Tab 1  
 amLODIPine (NORVASC) 10 mg tablet Take 1 Tab by mouth daily. Start taking 7/7 if blood pressure remain above 120 30 Tab 11  
 losartan (COZAAR) 25 mg tablet Take 1 Tab by mouth daily. Start taking 7/7 if blood pressure remain above 120 30 Tab 11  
 pravastatin (PRAVACHOL) 20 mg tablet take 1 tablet by mouth once daily 30 Tab 11 Past History Past Medical History: 
Past Medical History:  
Diagnosis Date  Arrhythmia  Arthritis  Calf pain   
 when walking  Cancer ([facility] [address]  Colon cancer ([facility] [address] 1/15/2010  Dementia 3/10/2010  Diabetes mellitus type 2, controlled ([address] 3/9/2016  DM (diabetes mellitus) ([address] 1/15/2010  DM (diabetes mellitus) ([address] 1/15/2010 Diet controlled  Headache(784.0)  Hearing loss  HTN (hypertension) 1/15/2010  Hyperlipidemia 1/15/2010  Leg swelling  MRSA (methicillin resistant staph aureus) culture positive 4/2013  
 SOB (shortness of breath) Past Surgical History: 
Past Surgical History:  
Procedure Laterality Date  ENDOSCOPY, COLON, DIAGNOSTIC    
 HX [address]LE[address]OMY [address] Family History: 
Family History Problem Relation Age of Onset  Diabetes Mother  Hypertension Mother  Heart Disease Mother  Stroke Mother  Diabetes Sister  Heart Disease Sister  Other Sister   
     alzheimers  Hypertension Sister  Cancer Sister  Hypertension Sister  Hypertension Brother  Heart Disease Brother Social History: 
Social History Tobacco Use  Smoking status: Never Smoker  Smokeless tobacco: Never Used Substance Use Topics  Alcohol use: No  
 Drug use: No  
 
 
Allergies: 
No Known Allergies Review of Systems Review of Systems Unable to perform ROS: Acuity of condition Hematological: Bruises/bleeds easily:   
 
 
Physical Exam  
Physical Exam  
Nursing note and vitals reviewed. General appearance - unresponsive, thin, elderly, and frail ENT - ET tube in place with good breath sounds BL with bagging Eyes - Pupils fixed and dilated Chest - clear to auscultation, no wheezes, rales or rhonchi; non-tender to palpation Heart - CPR in progress on arrival with good pulses on compressions Abdomen - soft Extremities - no pitting edema Skin - normal coloration and turgor, no rashes Written by Ruth Major ED Scribe, as dictated by Linsey Sharp MD 
 
Diagnostic Study Results Labs - Recent Results (from the past 12 hour(s)) EKG, 12 LEAD, INITIAL Collection Time: 12/04/18  1:40 AM  
Result Value Ref Range Ventricular Rate 71 BPM  
 Atrial Rate 71 BPM  
 QRS Duration 138 ms Q-T Interval 410 ms QTC Calculation (Bezet) 445 ms Calculated R Axis -60 degrees Calculated T Axis 68 degrees Diagnosis Wide QRS rhythm Left axis deviation Right bundle branch block When compared with ECG of 30-JUN-2017 13:00, 
Wide QRS rhythm has replaced Sinus rhythm Vent. rate has increased BY  23 BPM 
  
 
 
 
Medical Decision Making I am the first provider for this patient. I reviewed the vital signs, available nursing notes, past medical history, past surgical history, family history and social history. Vital Signs-Reviewed the patient's vital signs. No data found. Provider Notes (Medical Decision Making): DDx: cardiac arrest, CHF, arrhythmia, PE, electrolyte abnormality ED Course:  
Initial assessment performed. The patients presenting problems have been discussed, and they are in agreement with the care plan formulated and outlined with them. I have encouraged them to ask questions as they arise throughout their visit. CODE NOTE:  
 
0125 
2nd Epi given 0127 Pulse check, no palpable pulses, PEA noted on cardiac monitor, CPR resumed 3rd Epi given 6143 Pulse check, no palpable pulses, PEA noted on cardiac monitor, CPR resumed 0130 
4th Epi given [address] Pulse check, no palpable pulses, PEA noted on cardiac monitor, CPR resumed 0133 
5th Epi given [address] Pulse check, no palpable pulses, PEA noted on cardiac monitor, CPR resumed 5690 Pulse check, R femoral pulses Organized tachycardic rhythm  on the cardiac monitor Procedure Note - Limited Bedside Ultrasound- Cardiac 
0137 Performed by: Dennis Estrada MD 
Indication: cardiac arrest 
Interpretation:  
Transthoracic bedside US was performed using the apex view. Cardiac activity was appreciated showing hypodynamic activity. Pericardial fluid was not appreciated. [address] [place] HR 84 EKG interpretation: (Preliminary) 0140 Rhythm: accelerated junctional rhythm with RBBB.  Rate (approx.): 71bpm; Axis: left axis deviation; Normal VT, QRS, QTc intervals; ST/T wave:ST elevation in the inferior leads; 
 Written by Consuelo Clark, ED Scribe, as dictated by Mahnaz Farfan MD 
 
2119 No palpable pulses at this time 4839 Daughter at bedside. She agrees with plan to cease resuscitation. TIME OF DEATH CALLED CRITICAL CARE NOTE : 
 
 
IMPENDING DETERIORATION -Airway, Respiratory, Cardiovascular, CNS and Metabolic ASSOCIATED RISK FACTORS - Hypotension, Hypoxia, Dysrhythmia, Vascular Compromise and CNS Decompensation MANAGEMENT- Bedside Assessment and Supervision of Care INTERPRETATION -  Xrays, ECG and Blood Pressure INTERVENTIONS - vascular control CASE REVIEW - Family TREATMENT RESPONSE -Unchanged PERFORMED BY - Self NOTES   : 
 
 
I have spent 30 minutes of critical care time involved in lab review, consultations with specialist, family decision- making, bedside attention and documentation. During this entire length of time I was immediately available to the patient . Mahnaz Farfan MD 
 
 
Diagnosis Clinical Impression: 1. Cardiopulmonary arrest ([facility] [address] Disposition: 
 
TOD 0143 Attestations: This note is prepared by Consuelo Clark, acting as Scribe for MD Faviola Burns MD : The scribe's documentation has been prepared under my direction and personally reviewed by me in its entirety. I confirm that the note above accurately reflects all work, treatment, procedures, and medical decision making performed by me. This note will not be viewable in [address]

## 2018-12-04 NOTE — PROGRESS NOTES
Spiritual Care Assessment/Progress Note [address] 
 
 
NAME: Lidya Colmenares      MRN: [id number] AGE: 80 y.o. SEX: female Advent Affiliation: Jehovah's witness  
Language: English  
 
12/4/2018     Total Time (in minutes): 54  
 
Spiritual Assessment begun in hospitals EMERGENCY DEPT through conversation with: 
  
    []Patient        [x] Family    [] Friend(s) Reason for Consult: Death, ER Spiritual beliefs: (Please include comment if needed) [x] Identifies with a cynthia tradition:     
   [] Supported by a cynthia community:        
   [] Claims no spiritual orientation:       
   [] Seeking spiritual identity:            
   [] Adheres to an individual form of spirituality:       
   [x] Not able to assess:  Patient Identified resources for coping:  
   [x] Prayer                           
   [] Music                  [] Guided Imagery 
   [] Family/friends                 [] Pet visits [] Devotional reading                         [] Unknown 
   [] Other:                                        
 
 
Interventions offered during this visit: (See comments for more details) Family/Friend(s): Affirmation of emotions/emotional suffering, Affirmation of cynthia, Prayer (actual) Plan of Care: 
 
 [] Support spiritual and/or cultural needs  
 [] Support AMD and/or advance care planning process    
 [] Support grieving process 
 [] Coordinate Rites and/or Rituals  
 [] Coordination with community clergy [] No spiritual needs identified at this time 
 [] Detailed Plan of Care below (See Comments)  [] Make referral to Music Therapy 
[] Make referral to Pet Therapy    
[] Make referral to Addiction services 
[] Make referral to [facility] 
[] Make referral to Spiritual Care Partner 
[] No future visits requested       
[] Follow up visits as needed Comments: Responded to page from [address] staff to support family of patient who . Offered pastoral support to two of the daughters at bedside as she shared her feelings of grief. Offered words of encouragement and a prayer. Another daughter is en-route to the hospital. Family expressed no other needs at the moment. [facility] Bre[facility] Paging Service: [phone])

## 2018-12-05 LAB
ATRIAL RATE: 71 BPM
CALCULATED R AXIS, ECG10: -60 DEGREES
CALCULATED T AXIS, ECG11: 68 DEGREES
DIAGNOSIS, 93000: NORMAL
Q-T INTERVAL, ECG07: 410 MS
QRS DURATION, ECG06: 138 MS
QTC CALCULATION (BEZET), ECG08: 445 MS
VENTRICULAR RATE, ECG03: 71 BPM

## 2023-08-14 NOTE — TELEPHONE ENCOUNTER
Check with pharmacy to see if her amlodipine supply came from manufacturers recalled. Not all amlodipine recalled. normal